# Patient Record
Sex: FEMALE | Race: WHITE | NOT HISPANIC OR LATINO | Employment: OTHER | URBAN - METROPOLITAN AREA
[De-identification: names, ages, dates, MRNs, and addresses within clinical notes are randomized per-mention and may not be internally consistent; named-entity substitution may affect disease eponyms.]

---

## 2017-01-02 PROBLEM — B18.2 CHRONIC HEPATITIS C WITHOUT HEPATIC COMA (H): Status: ACTIVE | Noted: 2017-01-02

## 2017-01-25 ENCOUNTER — TELEPHONE (OUTPATIENT)
Dept: TRANSPLANT | Facility: CLINIC | Age: 67
End: 2017-01-25

## 2017-01-25 NOTE — TELEPHONE ENCOUNTER
Spoke with Joselyn, who is upset that she did not know of the possibility that she would be allergic to lactose after having her gall bladder removed with transplant.  She states that no one told her about this possibility.    She has stopped eating any dairy, or cheese and her diarrhea is gone.   She asks if she could take a supplement, since she has no gall bladder.  Discussed with her that supplements are unregulated, and would need to review with pharmacy.  She would like to speak with the pharmacist.    Further discussion with Joselyn, expressing frustration of scheduling with , and f/u imaging, wondering why she has it again in March after having it done in Dec.  Advised that she speak with  about this at her next appt in March.  Joselyn states she is most upset with the news that her , Bruce was found to have melanoma, on his ear.   He had seen dermatology for a suspicious scalp lesion, which was found to be non cancerous.  Bruce has had a biopsy of a lymph node in his neck, will have the results this week.   She is very worried that it will be more cancer.

## 2017-01-25 NOTE — TELEPHONE ENCOUNTER
Message left by pt requesting information about taking supplements, has been having diarrhea, and believes it is from her gall bladder being removed with the transplant.   She has found supplements she would like to take, requesting a call back about these supplements.

## 2017-01-31 DIAGNOSIS — Z79.60 LONG-TERM USE OF IMMUNOSUPPRESSANT MEDICATION: ICD-10-CM

## 2017-01-31 DIAGNOSIS — Z94.4 LIVER REPLACED BY TRANSPLANT (H): ICD-10-CM

## 2017-01-31 LAB
ALBUMIN SERPL-MCNC: 3.5 G/DL (ref 3.4–5)
ALP SERPL-CCNC: 125 U/L (ref 40–150)
ALT SERPL W P-5'-P-CCNC: 18 U/L (ref 0–50)
ANION GAP SERPL CALCULATED.3IONS-SCNC: 9 MMOL/L (ref 3–14)
AST SERPL W P-5'-P-CCNC: 13 U/L (ref 0–45)
BILIRUB DIRECT SERPL-MCNC: <0.1 MG/DL (ref 0–0.2)
BILIRUB SERPL-MCNC: 0.3 MG/DL (ref 0.2–1.3)
BUN SERPL-MCNC: 31 MG/DL (ref 7–30)
CALCIUM SERPL-MCNC: 8.9 MG/DL (ref 8.5–10.1)
CHLORIDE SERPL-SCNC: 104 MMOL/L (ref 94–109)
CO2 SERPL-SCNC: 28 MMOL/L (ref 20–32)
CREAT SERPL-MCNC: 0.84 MG/DL (ref 0.52–1.04)
ERYTHROCYTE [DISTWIDTH] IN BLOOD BY AUTOMATED COUNT: 13.8 % (ref 10–15)
GFR SERPL CREATININE-BSD FRML MDRD: 68 ML/MIN/1.7M2
GLUCOSE SERPL-MCNC: 113 MG/DL (ref 70–99)
HCT VFR BLD AUTO: 36.1 % (ref 35–47)
HGB BLD-MCNC: 12.4 G/DL (ref 11.7–15.7)
MAGNESIUM SERPL-MCNC: 1.8 MG/DL (ref 1.6–2.3)
MCH RBC QN AUTO: 29.2 PG (ref 26.5–33)
MCHC RBC AUTO-ENTMCNC: 34.3 G/DL (ref 31.5–36.5)
MCV RBC AUTO: 85 FL (ref 78–100)
PHOSPHATE SERPL-MCNC: 3.7 MG/DL (ref 2.5–4.5)
PLATELET # BLD AUTO: 162 10E9/L (ref 150–450)
POTASSIUM SERPL-SCNC: 4.2 MMOL/L (ref 3.4–5.3)
PROT SERPL-MCNC: 7.1 G/DL (ref 6.8–8.8)
RBC # BLD AUTO: 4.24 10E12/L (ref 3.8–5.2)
SODIUM SERPL-SCNC: 141 MMOL/L (ref 133–144)
TACROLIMUS BLD-MCNC: 3.6 UG/L (ref 5–15)
TME LAST DOSE: ABNORMAL H
WBC # BLD AUTO: 3.5 10E9/L (ref 4–11)

## 2017-01-31 PROCEDURE — 36415 COLL VENOUS BLD VENIPUNCTURE: CPT | Performed by: INTERNAL MEDICINE

## 2017-01-31 PROCEDURE — 80197 ASSAY OF TACROLIMUS: CPT | Performed by: INTERNAL MEDICINE

## 2017-01-31 PROCEDURE — 80076 HEPATIC FUNCTION PANEL: CPT | Performed by: INTERNAL MEDICINE

## 2017-01-31 PROCEDURE — 84100 ASSAY OF PHOSPHORUS: CPT | Performed by: INTERNAL MEDICINE

## 2017-01-31 PROCEDURE — 80048 BASIC METABOLIC PNL TOTAL CA: CPT | Performed by: INTERNAL MEDICINE

## 2017-01-31 PROCEDURE — 85027 COMPLETE CBC AUTOMATED: CPT | Performed by: INTERNAL MEDICINE

## 2017-01-31 PROCEDURE — 83735 ASSAY OF MAGNESIUM: CPT | Performed by: INTERNAL MEDICINE

## 2017-02-02 ENCOUNTER — OFFICE VISIT (OUTPATIENT)
Dept: FAMILY MEDICINE | Facility: CLINIC | Age: 67
End: 2017-02-02
Payer: COMMERCIAL

## 2017-02-02 VITALS
SYSTOLIC BLOOD PRESSURE: 150 MMHG | RESPIRATION RATE: 12 BRPM | DIASTOLIC BLOOD PRESSURE: 96 MMHG | OXYGEN SATURATION: 99 % | HEIGHT: 65 IN | WEIGHT: 176.1 LBS | HEART RATE: 91 BPM | BODY MASS INDEX: 29.34 KG/M2 | TEMPERATURE: 97.9 F

## 2017-02-02 DIAGNOSIS — I10 ESSENTIAL HYPERTENSION WITH GOAL BLOOD PRESSURE LESS THAN 140/90: Primary | ICD-10-CM

## 2017-02-02 DIAGNOSIS — D84.9 IMMUNOSUPPRESSION (H): ICD-10-CM

## 2017-02-02 DIAGNOSIS — Z94.4 LIVER TRANSPLANT RECIPIENT (H): ICD-10-CM

## 2017-02-02 DIAGNOSIS — B18.2 CHRONIC HEPATITIS C WITHOUT HEPATIC COMA (H): ICD-10-CM

## 2017-02-02 DIAGNOSIS — K74.60 CIRRHOSIS OF LIVER WITHOUT ASCITES, UNSPECIFIED HEPATIC CIRRHOSIS TYPE (H): ICD-10-CM

## 2017-02-02 LAB
ALBUMIN SERPL-MCNC: 3.8 G/DL (ref 3.4–5)
ALP SERPL-CCNC: 136 U/L (ref 40–150)
ALT SERPL W P-5'-P-CCNC: 20 U/L (ref 0–50)
ANION GAP SERPL CALCULATED.3IONS-SCNC: 9 MMOL/L (ref 3–14)
AST SERPL W P-5'-P-CCNC: 11 U/L (ref 0–45)
BILIRUB SERPL-MCNC: 0.4 MG/DL (ref 0.2–1.3)
BUN SERPL-MCNC: 27 MG/DL (ref 7–30)
CALCIUM SERPL-MCNC: 9.1 MG/DL (ref 8.5–10.1)
CHLORIDE SERPL-SCNC: 104 MMOL/L (ref 94–109)
CO2 SERPL-SCNC: 29 MMOL/L (ref 20–32)
CREAT SERPL-MCNC: 0.71 MG/DL (ref 0.52–1.04)
GFR SERPL CREATININE-BSD FRML MDRD: 82 ML/MIN/1.7M2
GLUCOSE SERPL-MCNC: 99 MG/DL (ref 70–99)
POTASSIUM SERPL-SCNC: 4.7 MMOL/L (ref 3.4–5.3)
PROT SERPL-MCNC: 7.8 G/DL (ref 6.8–8.8)
SODIUM SERPL-SCNC: 142 MMOL/L (ref 133–144)

## 2017-02-02 PROCEDURE — 80053 COMPREHEN METABOLIC PANEL: CPT | Performed by: PHYSICIAN ASSISTANT

## 2017-02-02 PROCEDURE — 99213 OFFICE O/P EST LOW 20 MIN: CPT | Performed by: PHYSICIAN ASSISTANT

## 2017-02-02 PROCEDURE — 36415 COLL VENOUS BLD VENIPUNCTURE: CPT | Performed by: PHYSICIAN ASSISTANT

## 2017-02-02 RX ORDER — LOSARTAN POTASSIUM 50 MG/1
50 TABLET ORAL DAILY
Qty: 30 TABLET | Refills: 1 | Status: SHIPPED | OUTPATIENT
Start: 2017-02-02 | End: 2017-03-02 | Stop reason: DRUGHIGH

## 2017-02-02 NOTE — PATIENT INSTRUCTIONS
Increase losartan to 50 mg daily and follow up with us in one month.  Return urgently if any change in symptoms.    Continue to check blood pressure outside of clinic.  Work on diet and exercise to decrease weight

## 2017-02-02 NOTE — NURSING NOTE
"Chief Complaint   Patient presents with     Hypertension       Initial /96 mmHg  Pulse 91  Temp(Src) 97.9  F (36.6  C) (Oral)  Resp 12  Ht 1.651 m (5' 5\")  Wt 79.878 kg (176 lb 1.6 oz)  BMI 29.30 kg/m2  SpO2 99% Estimated body mass index is 29.3 kg/(m^2) as calculated from the following:    Height as of this encounter: 1.651 m (5' 5\").    Weight as of this encounter: 79.878 kg (176 lb 1.6 oz).  BP completed using cuff size: kenn Harris        "

## 2017-02-02 NOTE — MR AVS SNAPSHOT
After Visit Summary   2/2/2017    Joselyn Leigh    MRN: [id number]           Patient Information     Date Of Birth          1950        Visit Information        Provider Department      2/2/2017 2:00 PM Izabella Maldonado PA-C [facility]        Today's Diagnoses     Essential hypertension with goal blood pressure less than 140/90    -  1       Care Instructions    Increase losartan to 50 mg daily and follow up with us in one month.  Return urgently if any change in symptoms.    Continue to check blood pressure outside of clinic.  Work on diet and exercise to decrease weight          Follow-ups after your visit        Your next 10 appointments already scheduled     Feb 16, 2017 11:00 AM   (Arrive by 10:45 AM)   Transplant Skin Check with CONSTANCE Pearson MD   [facility] ([facility])    [address] [place] [id number]   [phone]            Mar 10, 2017  9:00 AM   (Arrive by 8:45 AM)   Return Liver Transplant with Chuck Mata MD   [facility] ([facility])    [address] [place] [address]   [phone]            Mar 10, 2017 10:20 AM   (Arrive by 10:05 AM)   CT CHEST/ABDOMEN/PELVIS W CONTRAST with UCCT2   [facility] [place] [facility] ([facility])    [address] [place] [id number] [phone]           Please bring any scans or X-rays taken at other hospitals, if similar tests were done. Also bring a list of your medicines, including vitamins, minerals and over-the-counter drugs. It is safest to leave personal items at home.  Be sure to tell your doctor:   If you have any allergies.   If there s any chance you are pregnant.   If you are breastfeeding.   If you have any special needs.  You may have contrast for this exam. To prepare:   Do not eat or drink for 2 hours before your exam. If you need to  take medicine, you may take it with small sips of water. (We may ask you to take liquid medicine as well.)   The day before your exam, drink extra fluids at least six 8-ounce glasses (unless your doctor tells you to restrict your fluids).  Patients over 70 or patients with diabetes or kidney problems:   If you haven t had a blood test (creatinine test) within the last 30 days, go to your clinic or Diagnostic Imaging Department for this test.  If you have diabetes:   If your kidney function is normal, continue taking your metformin (Avandamet, Glucophage, Glucovance, Metaglip) on the day of your exam.   If your kidney function is abnormal, wait 48 hours before restarting this medicine.  You will have oral contrast for this exam:   You will drink the contrast at home. Get this from your clinic or Diagnostic Imaging Department. Please follow the directions given.  Please wear loose clothing, such as a sweat suit or jogging clothes. Avoid snaps, zippers and other metal. We may ask you to undress and put on a hospital gown.  If you have any questions, please call the Imaging Department where you will have your exam.            Mar 13, 2017  8:15 AM   (Arrive by 8:00 AM)   Return Visit with Brian Ozuna MD   [facility] ([facility])    [address] [place] [id number] [phone]              Who to contact     If you have questions or need follow up information about today's clinic visit or your schedule please contact [facility] directly at [phone].  Normal or non-critical lab and imaging results will be communicated to you by MyChart, letter or phone within 4 business days after the clinic has received the results. If you do not hear from us within 7 days, please contact the clinic through MyChart or phone. If you have a critical or abnormal lab result, we will notify you by phone as soon as possible.  Submit refill  "requests through [employer] or call your pharmacy and they will forward the refill request to us. Please allow 3 business days for your refill to be completed.          Additional Information About Your Visit        FolderBoyhart Information     [employer] gives you secure access to your electronic health record. If you see a primary care provider, you can also send messages to your care team and make appointments. If you have questions, please call your primary care clinic.  If you do not have a primary care provider, please call [phone] and they will assist you.        Care EveryWhere ID     This is your Care EveryWhere ID. This could be used by other organizations to access your [facility] medical records  [id number]        Your Vitals Were     Pulse Temperature Respirations Height BMI (Body Mass Index) Pulse Oximetry    91 97.9  F (36.6  C) (Oral) 12 1.651 m (5' 5\") 29.30 kg/m2 99%       Blood Pressure from Last 3 Encounters:   02/02/17 150/96   12/29/16 150/98   12/19/16 160/95    Weight from Last 3 Encounters:   02/02/17 79.878 kg (176 lb 1.6 oz)   12/29/16 80.241 kg (176 lb 14.4 oz)   12/19/16 80.015 kg (176 lb 6.4 oz)              We Performed the Following     Comprehensive metabolic panel          Today's Medication Changes          These changes are accurate as of: 2/2/17  2:26 PM.  If you have any questions, ask your nurse or doctor.               These medicines have changed or have updated prescriptions.        Dose/Directions    * losartan 25 MG tablet   Commonly known as:  COZAAR   This may have changed:  Another medication with the same name was added. Make sure you understand how and when to take each.   Used for:  Essential hypertension   Changed by:  Izabella Maldonado PA-C        Dose:  25 mg   Take 1 tablet (25 mg) by mouth daily   Quantity:  30 tablet   Refills:  1       * losartan 50 MG tablet   Commonly known as:  COZAAR   This may have changed:  You were already taking a medication with the " same name, and this prescription was added. Make sure you understand how and when to take each.   Used for:  Essential hypertension with goal blood pressure less than 140/90   Changed by:  Izabella Maldonado PA-C        Dose:  50 mg   Take 1 tablet (50 mg) by mouth daily   Quantity:  30 tablet   Refills:  1       magnesium oxide 400 (241.3 MG) MG tablet   Commonly known as:  MAG-OX   This may have changed:  when to take this   Used for:  Low magnesium levels        Dose:  400 mg   Take 1 tablet (400 mg) by mouth 2 times daily   Quantity:  60 tablet   Refills:  11       * Notice:  This list has 2 medication(s) that are the same as other medications prescribed for you. Read the directions carefully, and ask your doctor or other care provider to review them with you.         Where to get your medicines      These medications were sent to [facility] [address] [place], [place] - [address] [facility] [address], [facility] [address]     Phone:  [phone]    - losartan 50 MG tablet             Primary Care Provider Office Phone #    [facility] [phone]       No address on file        Thank you!     Thank you for choosing [facility]  for your care. Our goal is always to provide you with excellent care. Hearing back from our patients is one way we can continue to improve our services. Please take a few minutes to complete the written survey that you may receive in the mail after your visit with us. Thank you!             Your Updated Medication List - Protect others around you: Learn how to safely use, store and throw away your medicines at www.disposemymeds.org.          This list is accurate as of: 2/2/17  2:26 PM.  Always use your most recent med list.                   Brand Name Dispense Instructions for use    calcium-vitamin D 600-400 MG-UNIT per tablet    CALTRATE     Take 1 tablet by mouth 2 times daily       * losartan 25  MG tablet    COZAAR    30 tablet    Take 1 tablet (25 mg) by mouth daily       * losartan 50 MG tablet    COZAAR    30 tablet    Take 1 tablet (50 mg) by mouth daily       magnesium oxide 400 (241.3 MG) MG tablet    MAG-OX    60 tablet    Take 1 tablet (400 mg) by mouth 2 times daily       Multi-vitamin Tabs tablet      Take 1 tablet by mouth daily prenatal       priLOSEC 20 MG CR capsule   Generic drug:  omeprazole      Take 20 mg by mouth daily       tacrolimus 1 MG capsule    PROGRAF - GENERIC EQUIVALENT    240 capsule    Take 4 capsules (4 mg) by mouth every 12 hours       * Notice:  This list has 2 medication(s) that are the same as other medications prescribed for you. Read the directions carefully, and ask your doctor or other care provider to review them with you.

## 2017-02-02 NOTE — PROGRESS NOTES
SUBJECTIVE:                                                    Joselyn Leigh is a 66 year old female who presents to clinic today for the following health issues:      Hypertension Follow-up      Outpatient blood pressures are being checked at home.  Results are 130s/90.    Low Salt Diet: low salt       Amount of exercise or physical activity: None    Problems taking medications regularly: No    Medication side effects: none    Diet: regular (no restrictions)    Has blood pressure monitor at home and reports readings of 132/90  No exercise.   Pressure in abdomen when eats food.  Feels bloated. Colonoscopy recommended in five years.  Normal colonoscopy .  Previous colonoscopy 3 yrs prior with polyps  History of liver transplant.  Labs just completed 17.    Problem list and histories reviewed & adjusted, as indicated.  Additional history: as documented    Patient Active Problem List   Diagnosis     HCC (hepatocellular carcinoma) (H)     History of colonic polyps     Chronic hepatitis C (H)     Drug-induced mental disorder (H)     Cirrhosis of liver without ascites (H)     Liver transplant recipient (H)     Liver replaced by transplant (H)     Immunosuppression (H)     Steroid-induced hyperglycemia     Diarrhea     Hypervolemia     Chronic hepatitis C without hepatic coma (H)     Past Surgical History   Procedure Laterality Date      section       Transplant liver recipient  donor N/A 5/10/2016     Procedure: TRANSPLANT LIVER RECIPIENT  DONOR;  Surgeon: Farshad Nowak MD;  Location:  OR     Esophagoscopy, gastroscopy, duodenoscopy (egd), combined N/A 2016     Procedure: COMBINED ESOPHAGOSCOPY, GASTROSCOPY, DUODENOSCOPY (EGD), REMOVE FOREIGN BODY;  Surgeon: George Crockett MD;  Location:  GI       Social History   Substance Use Topics     Smoking status: Former Smoker     Types: Cigarettes     Smokeless tobacco: Never Used     Alcohol Use: No     Family History  "  Problem Relation Age of Onset     Blood Disease Mother      leukemia     Prostate Cancer Father      DIABETES Mother      DIABETES Father          Current Outpatient Prescriptions   Medication Sig Dispense Refill            losartan (COZAAR) 25 MG tablet Take 1 tablet (25 mg) by mouth daily 30 tablet 1     tacrolimus (PROGRAF - GENERIC EQUIVALENT) 1 MG capsule Take 4 capsules (4 mg) by mouth every 12 hours 240 capsule 11     magnesium oxide (MAG-OX) 400 (241.3 MG) MG tablet Take 1 tablet (400 mg) by mouth 2 times daily (Patient taking differently: Take 400 mg by mouth daily ) 60 tablet 11     calcium-vitamin D (CALTRATE) 600-400 MG-UNIT per tablet Take 1 tablet by mouth 2 times daily       omeprazole (PRILOSEC) 20 MG capsule Take 20 mg by mouth daily       multivitamin, therapeutic with minerals (MULTI-VITAMIN) TABS Take 1 tablet by mouth daily prenatal         ROS:  Constitutional, HEENT, cardiovascular, pulmonary, gi and gu systems are negative, except as otherwise noted.    OBJECTIVE:                                                    /96 mmHg  Pulse 91  Temp(Src) 97.9  F (36.6  C) (Oral)  Resp 12  Ht 1.651 m (5' 5\")  Wt 79.878 kg (176 lb 1.6 oz)  BMI 29.30 kg/m2  SpO2 99%  Body mass index is 29.3 kg/(m^2).  GENERAL: healthy, alert and no distress  NECK: no adenopathy, no asymmetry, masses, or scars and thyroid normal to palpation  RESP: lungs clear to auscultation - no rales, rhonchi or wheezes  CV: regular rate and rhythm, normal S1 S2, no S3 or S4, no murmur, click or rub, no peripheral edema and peripheral pulses strong  ABDOMEN: soft, nontender, no hepatosplenomegaly, no masses and bowel sounds normal  MS: no gross musculoskeletal defects noted, no edema    Diagnostic Test Results:  Results for orders placed or performed in visit on 02/02/17   Comprehensive metabolic panel   Result Value Ref Range    Sodium 142 133 - 144 mmol/L    Potassium 4.7 3.4 - 5.3 mmol/L    Chloride 104 94 - 109 mmol/L " "   Carbon Dioxide 29 20 - 32 mmol/L    Anion Gap 9 3 - 14 mmol/L    Glucose 99 70 - 99 mg/dL    Urea Nitrogen 27 7 - 30 mg/dL    Creatinine 0.71 0.52 - 1.04 mg/dL    GFR Estimate 82 >60 mL/min/1.7m2    GFR Estimate If Black >90   GFR Calc   >60 mL/min/1.7m2    Calcium 9.1 8.5 - 10.1 mg/dL    Bilirubin Total 0.4 0.2 - 1.3 mg/dL    Albumin 3.8 3.4 - 5.0 g/dL    Protein Total 7.8 6.8 - 8.8 g/dL    Alkaline Phosphatase 136 40 - 150 U/L    ALT 20 0 - 50 U/L    AST 11 0 - 45 U/L        ASSESSMENT/PLAN:                                                        BMI:   Estimated body mass index is 29.3 kg/(m^2) as calculated from the following:    Height as of this encounter: 1.651 m (5' 5\").    Weight as of this encounter: 79.878 kg (176 lb 1.6 oz).   Weight management plan: Discussed healthy diet and exercise guidelines and patient will follow up in 1 month in clinic to re-evaluate.      1. Essential hypertension with goal blood pressure less than 140/90  Blood pressure not at goal.  Will increase cozaar and follow up in one month  - Comprehensive metabolic panel  - losartan (COZAAR) 50 MG tablet; Take 1 tablet (50 mg) by mouth daily  Dispense: 30 tablet; Refill: 1    2. Chronic hepatitis C without hepatic coma (H)  Followed by GI     3. Immunosuppression (H)  Due to liver transplant    4. Liver transplant recipient (H)  5/10/16.  Followed at      5. Cirrhosis of liver without ascites, unspecified hepatic cirrhosis type (H)  Transplant of liver.  Followed by        Patient Instructions   Increase losartan to 50 mg daily and follow up with us in one month.  Return urgently if any change in symptoms.    Continue to check blood pressure outside of clinic.  Work on diet and exercise to decrease weight           Izabella Maldonado PA-C  [facility]"

## 2017-02-03 NOTE — PROGRESS NOTES
Quick Note:    Wu Alves  Your electrolytes, blood sugar, kidney function, and liver function were normal.   Please call or MyChart my office with any questions or concerns.   Izabella Maldonado, PAC            ______

## 2017-02-05 PROBLEM — K74.60 CIRRHOSIS OF LIVER WITHOUT ASCITES, UNSPECIFIED HEPATIC CIRRHOSIS TYPE (H): Status: ACTIVE | Noted: 2017-02-05

## 2017-02-07 PROBLEM — I10 ESSENTIAL HYPERTENSION WITH GOAL BLOOD PRESSURE LESS THAN 140/90: Status: ACTIVE | Noted: 2017-02-07

## 2017-02-16 ENCOUNTER — OFFICE VISIT (OUTPATIENT)
Dept: DERMATOLOGY | Facility: CLINIC | Age: 67
End: 2017-02-16

## 2017-02-16 DIAGNOSIS — D22.9 MULTIPLE BENIGN NEVI: ICD-10-CM

## 2017-02-16 DIAGNOSIS — L90.5 SCAR: ICD-10-CM

## 2017-02-16 DIAGNOSIS — Z94.89 TRANSPLANT RECIPIENT: Primary | ICD-10-CM

## 2017-02-16 ASSESSMENT — PAIN SCALES - GENERAL: PAINLEVEL: NO PAIN (0)

## 2017-02-16 NOTE — LETTER
2/16/2017       RE: Joselyn Leigh  [address]  [facility] [address]     Dear Colleague,    Thank you for referring your patient, Joselyn Leigh, to the [facility] at [facility]. Please see a copy of my visit note below.     Dictation on: 02/16/2017 12:48 PM by: CONSTANCE EPPS [[id number]]         Again, thank you for allowing me to participate in the care of your patient.      Sincerely,    CONSTANCE Epps MD

## 2017-02-16 NOTE — MR AVS SNAPSHOT
After Visit Summary   2/16/2017    Joselyn Leigh    MRN: [id number]           Patient Information     Date Of Birth          1950        Visit Information        Provider Department      2/16/2017 11:00 AM CONSTANCE Pearson MD [facility] Dermatology        Today's Diagnoses     Transplant recipient    -  1    Multiple benign nevi        Scar           Follow-ups after your visit        Your next 10 appointments already scheduled     Mar 02, 2017  8:40 AM CST   Office Visit with Izabella Maldonado PA-C   [facility]    [address] [place] [id number] [phone]           Bring a current list of meds and any records pertaining to this visit.  For Physicals, please bring immunization records and any forms needing to be filled out.  Please arrive 10 minutes early to complete paperwork.            Mar 02, 2017  9:00 AM CST   LAB with BA LAB ONLY   [facility] ([facility])    [address] [place] [id number] [phone]           Patient must bring picture ID.  Patient should be prepared to give a urine specimen  Please do not eat 10-12 hours before your appointment if you are coming in fasting for labs on lipids, cholesterol, or glucose (sugar).  Pregnant women should follow their Care Team instructions. Water with medications is okay. Do not drink coffee or other fluids.   If you have concerns about taking  your medications, please ask at office or if scheduling via CR2hart, send a message by clicking on Secure Messaging, Message Your Care Team.            Mar 10, 2017  9:00 AM CST   (Arrive by 8:45 AM)   Return Liver Transplant with Chuck Mata MD   [facility] Hepatology ([facility])    [address] [place] [id number] [phone]            Mar 10, 2017 10:20 AM CST   (Arrive by 10:05 AM)   CT CHEST/ABDOMEN/PELVIS W  CONTRAST with UCCT2   [facility] ([facility])    [address]  1st Floor  [facility] [address] [phone]           Please bring any scans or X-rays taken at other hospitals, if similar tests were done. Also bring a list of your medicines, including vitamins, minerals and over-the-counter drugs. It is safest to leave personal items at home.  Be sure to tell your doctor:   If you have any allergies.   If there s any chance you are pregnant.   If you are breastfeeding.   If you have any special needs.  You may have contrast for this exam. To prepare:   Do not eat or drink for 2 hours before your exam. If you need to take medicine, you may take it with small sips of water. (We may ask you to take liquid medicine as well.)   The day before your exam, drink extra fluids at least six 8-ounce glasses (unless your doctor tells you to restrict your fluids).  Patients over 70 or patients with diabetes or kidney problems:   If you haven t had a blood test (creatinine test) within the last 30 days, go to your clinic or Diagnostic Imaging Department for this test.  If you have diabetes:   If your kidney function is normal, continue taking your metformin (Avandamet, Glucophage, Glucovance, Metaglip) on the day of your exam.   If your kidney function is abnormal, wait 48 hours before restarting this medicine.  You will have oral contrast for this exam:   You will drink the contrast at home. Get this from your clinic or Diagnostic Imaging Department. Please follow the directions given.  Please wear loose clothing, such as a sweat suit or jogging clothes. Avoid snaps, zippers and other metal. We may ask you to undress and put on a hospital gown.  If you have any questions, please call the Imaging Department where you will have your exam.            Mar 13, 2017  8:15 AM CDT   (Arrive by 8:00 AM)   Return Visit with Brian Ozuna MD   [facility] (  [facility])    [address]  2nd Floor  [facility] [address] [phone]              Who to contact     Please call your clinic at [phone] to:    Ask questions about your health    Make or cancel appointments    Discuss your medicines    Learn about your test results    Speak to your doctor   If you have compliments or concerns about an experience at your clinic, or if you wish to file a complaint, please contact [facility] Patient Relations at [phone] or email us at [email or web address]         Additional Information About Your Visit        [employer] Information     [employer] gives you secure access to your electronic health record. If you see a primary care provider, you can also send messages to your care team and make appointments. If you have questions, please call your primary care clinic.  If you do not have a primary care provider, please call [phone] and they will assist you.      Fision is an electronic gateway that provides easy, online access to your medical records. With [employer], you can request a clinic appointment, read your test results, renew a prescription or communicate with your care team.     To access your existing account, please contact your [facility] or call [phone] for assistance.        Care EveryWhere ID     This is your Care EveryWhere ID. This could be used by other organizations to access your [place] medical records  [id number]         Blood Pressure from Last 3 Encounters:   No data found for BP    Weight from Last 3 Encounters:   No data found for Wt              Today, you had the following     No orders found for display         Today's Medication Changes          These changes are accurate as of: 2/16/17 11:59 PM.  If you have any questions, ask your nurse or doctor.               These medicines have changed or have updated prescriptions.         Dose/Directions    magnesium oxide 400 (241.3 MG) MG tablet   Commonly known as:  MAG-OX   This may have changed:  when to take this   Used for:  Low magnesium levels        Dose:  400 mg   Take 1 tablet (400 mg) by mouth 2 times daily   Quantity:  60 tablet   Refills:  11                Primary Care Provider Office Phone #    [facility] [phone]       No address on file        Thank you!     Thank you for choosing [facility]  for your care. Our goal is always to provide you with excellent care. Hearing back from our patients is one way we can continue to improve our services. Please take a few minutes to complete the written survey that you may receive in the mail after your visit with us. Thank you!             Your Updated Medication List - Protect others around you: Learn how to safely use, store and throw away your medicines at www.disposemymeds.org.          This list is accurate as of: 2/16/17 11:59 PM.  Always use your most recent med list.                   Brand Name Dispense Instructions for use    calcium-vitamin D 600-400 MG-UNIT per tablet    CALTRATE     Take 1 tablet by mouth 2 times daily       losartan 50 MG tablet    COZAAR    30 tablet    Take 1 tablet (50 mg) by mouth daily       magnesium oxide 400 (241.3 MG) MG tablet    MAG-OX    60 tablet    Take 1 tablet (400 mg) by mouth 2 times daily       Multi-vitamin Tabs tablet      Take 1 tablet by mouth daily prenatal       OSTEO BI-FLEX ADV DOUBLE ST PO      Take by mouth daily       priLOSEC 20 MG CR capsule   Generic drug:  omeprazole      Take 20 mg by mouth daily       tacrolimus 1 MG capsule    PROGRAF - GENERIC EQUIVALENT    240 capsule    Take 4 capsules (4 mg) by mouth every 12 hours

## 2017-02-16 NOTE — NURSING NOTE
Dermatology Rooming Note    Joselyn Leigh's goals for this visit include:   Chief Complaint   Patient presents with     Skin Check     transplant skin check         Janett Lara LPN

## 2017-02-16 NOTE — LETTER
2/16/2017      RE: Joselyn Leigh  [address]  [place] [address]       SUBJECTIVE:  Joselyn comes in for a general skin check.  She has had some significant issues since  transplantation of her liver.  She has been doing well lately.  Unfortunately, her  is having surgery for melanoma today.  She requests a complete skin exam.      OBJECTIVE:  Shows a healthy lady in no distress.  We checked her face, neck, scalp, arms, legs, back.  We did not check breasts or genital areas or buttocks.  She has  some scattered nevi.  She seemed a little pale. .  She showed no pigmented lesions of any concern.  She was concerned about 2 lesions on her thigh which appeared to be early nevi, very light brown small macular lesions. Dermoscopy negative for concerning components.     Medications and allergies were reviewed.      ASSESSMENT:  Overall doing well, all things considered in transplant recipient.      PLAN:  Reassured.  Advised the lesions on her thighs were harmless in my opinion, did not need a biopsy.      Return in 1 year if all goes well.  We discussed self examination and how important that was and also to have her  look at her back if she cannot do that herself.        She has avoided sun most of her life and as a result has almost no sun damage other than some freckling on her upper back.       CONSTANCE Pearson MD

## 2017-02-16 NOTE — PROGRESS NOTES
SUBJECTIVE:  Joselyn comes in for a general skin check.  She has had some significant issues since  transplantation of her liver.  She has been doing well lately.  Unfortunately, her  is having surgery for melanoma today.  She requests a complete skin exam.      OBJECTIVE:  Shows a healthy lady in no distress.  We checked her face, neck, scalp, arms, legs, back.  We did not check breasts or genital areas or buttocks.  She has  some scattered nevi.  She seemed a little pale. .  She showed no pigmented lesions of any concern.  She was concerned about 2 lesions on her thigh which appeared to be early nevi, very light brown small macular lesions. Dermoscopy negative for concerning components.     Medications and allergies were reviewed.      ASSESSMENT:  Overall doing well, all things considered in transplant recipient.      PLAN:  Reassured.  Advised the lesions on her thighs were harmless in my opinion, did not need a biopsy.      Return in 1 year if all goes well.  We discussed self examination and how important that was and also to have her  look at her back if she cannot do that herself.        She has avoided sun most of her life and as a result has almost no sun damage other than some freckling on her upper back.

## 2017-02-22 ENCOUNTER — TELEPHONE (OUTPATIENT)
Dept: TRANSPLANT | Facility: CLINIC | Age: 67
End: 2017-02-22

## 2017-03-02 ENCOUNTER — OFFICE VISIT (OUTPATIENT)
Dept: FAMILY MEDICINE | Facility: CLINIC | Age: 67
End: 2017-03-02
Payer: COMMERCIAL

## 2017-03-02 ENCOUNTER — TELEPHONE (OUTPATIENT)
Dept: GASTROENTEROLOGY | Facility: CLINIC | Age: 67
End: 2017-03-02

## 2017-03-02 VITALS
HEIGHT: 65 IN | OXYGEN SATURATION: 99 % | TEMPERATURE: 97.8 F | DIASTOLIC BLOOD PRESSURE: 86 MMHG | WEIGHT: 180.4 LBS | BODY MASS INDEX: 30.06 KG/M2 | RESPIRATION RATE: 12 BRPM | SYSTOLIC BLOOD PRESSURE: 130 MMHG | HEART RATE: 85 BPM

## 2017-03-02 DIAGNOSIS — I10 ESSENTIAL HYPERTENSION WITH GOAL BLOOD PRESSURE LESS THAN 140/90: Primary | ICD-10-CM

## 2017-03-02 DIAGNOSIS — G89.29 CHRONIC PAIN OF RIGHT KNEE: ICD-10-CM

## 2017-03-02 DIAGNOSIS — M25.561 CHRONIC PAIN OF RIGHT KNEE: ICD-10-CM

## 2017-03-02 DIAGNOSIS — C22.0 HCC (HEPATOCELLULAR CARCINOMA) (H): ICD-10-CM

## 2017-03-02 DIAGNOSIS — Z79.60 LONG-TERM USE OF IMMUNOSUPPRESSANT MEDICATION: ICD-10-CM

## 2017-03-02 DIAGNOSIS — Z94.4 LIVER REPLACED BY TRANSPLANT (H): ICD-10-CM

## 2017-03-02 DIAGNOSIS — Z23 NEED FOR PROPHYLACTIC VACCINATION AGAINST STREPTOCOCCUS PNEUMONIAE (PNEUMOCOCCUS): ICD-10-CM

## 2017-03-02 LAB
ALBUMIN SERPL-MCNC: 3.6 G/DL (ref 3.4–5)
ALP SERPL-CCNC: 111 U/L (ref 40–150)
ALT SERPL W P-5'-P-CCNC: 20 U/L (ref 0–50)
ANION GAP SERPL CALCULATED.3IONS-SCNC: 9 MMOL/L (ref 3–14)
AST SERPL W P-5'-P-CCNC: 12 U/L (ref 0–45)
BILIRUB DIRECT SERPL-MCNC: 0.1 MG/DL (ref 0–0.2)
BILIRUB SERPL-MCNC: 0.6 MG/DL (ref 0.2–1.3)
BUN SERPL-MCNC: 30 MG/DL (ref 7–30)
CALCIUM SERPL-MCNC: 8.7 MG/DL (ref 8.5–10.1)
CHLORIDE SERPL-SCNC: 105 MMOL/L (ref 94–109)
CO2 SERPL-SCNC: 28 MMOL/L (ref 20–32)
CREAT SERPL-MCNC: 0.8 MG/DL (ref 0.52–1.04)
ERYTHROCYTE [DISTWIDTH] IN BLOOD BY AUTOMATED COUNT: 13.7 % (ref 10–15)
GFR SERPL CREATININE-BSD FRML MDRD: 72 ML/MIN/1.7M2
GLUCOSE SERPL-MCNC: 137 MG/DL (ref 70–99)
HCT VFR BLD AUTO: 34.3 % (ref 35–47)
HGB BLD-MCNC: 12 G/DL (ref 11.7–15.7)
MAGNESIUM SERPL-MCNC: 1.7 MG/DL (ref 1.6–2.3)
MCH RBC QN AUTO: 29.9 PG (ref 26.5–33)
MCHC RBC AUTO-ENTMCNC: 35 G/DL (ref 31.5–36.5)
MCV RBC AUTO: 86 FL (ref 78–100)
PHOSPHATE SERPL-MCNC: 4 MG/DL (ref 2.5–4.5)
PLATELET # BLD AUTO: 145 10E9/L (ref 150–450)
POTASSIUM SERPL-SCNC: 4.5 MMOL/L (ref 3.4–5.3)
PROT SERPL-MCNC: 7.1 G/DL (ref 6.8–8.8)
RBC # BLD AUTO: 4.01 10E12/L (ref 3.8–5.2)
SODIUM SERPL-SCNC: 142 MMOL/L (ref 133–144)
TACROLIMUS BLD-MCNC: 6.9 UG/L (ref 5–15)
TME LAST DOSE: NORMAL H
WBC # BLD AUTO: 3.1 10E9/L (ref 4–11)

## 2017-03-02 PROCEDURE — 80076 HEPATIC FUNCTION PANEL: CPT | Performed by: INTERNAL MEDICINE

## 2017-03-02 PROCEDURE — 84100 ASSAY OF PHOSPHORUS: CPT | Performed by: INTERNAL MEDICINE

## 2017-03-02 PROCEDURE — 99214 OFFICE O/P EST MOD 30 MIN: CPT | Mod: 25 | Performed by: PHYSICIAN ASSISTANT

## 2017-03-02 PROCEDURE — 36415 COLL VENOUS BLD VENIPUNCTURE: CPT | Performed by: INTERNAL MEDICINE

## 2017-03-02 PROCEDURE — 80197 ASSAY OF TACROLIMUS: CPT | Performed by: INTERNAL MEDICINE

## 2017-03-02 PROCEDURE — G0009 ADMIN PNEUMOCOCCAL VACCINE: HCPCS | Performed by: PHYSICIAN ASSISTANT

## 2017-03-02 PROCEDURE — 83735 ASSAY OF MAGNESIUM: CPT | Performed by: INTERNAL MEDICINE

## 2017-03-02 PROCEDURE — 85027 COMPLETE CBC AUTOMATED: CPT | Performed by: INTERNAL MEDICINE

## 2017-03-02 PROCEDURE — 90670 PCV13 VACCINE IM: CPT | Performed by: PHYSICIAN ASSISTANT

## 2017-03-02 PROCEDURE — 80048 BASIC METABOLIC PNL TOTAL CA: CPT | Performed by: INTERNAL MEDICINE

## 2017-03-02 PROCEDURE — 82105 ALPHA-FETOPROTEIN SERUM: CPT | Performed by: NURSE PRACTITIONER

## 2017-03-02 RX ORDER — LOSARTAN POTASSIUM 100 MG/1
100 TABLET ORAL DAILY
Qty: 30 TABLET | Refills: 1 | Status: SHIPPED | OUTPATIENT
Start: 2017-03-02 | End: 2017-04-27

## 2017-03-02 NOTE — PATIENT INSTRUCTIONS
Increase cozaar to 100 mg daily  Follow up with us in one month.   Return urgently if any change in symptoms.    Continue to check blood pressure.   Work on diet and limit sodium to no more than 2 grams daily.

## 2017-03-02 NOTE — NURSING NOTE
Screening Questionnaire for Adult Immunization    Are you sick today?   No   Do you have allergies to medications, food, a vaccine component or latex?   No   Have you ever had a serious reaction after receiving a vaccination?   No   Do you have a long-term health problem with heart disease, lung disease, asthma, kidney disease, metabolic disease (e.g. diabetes), anemia, or other blood disorder?   No   Do you have cancer, leukemia, HIV/AIDS, or any other immune system problem?   No   In the past 3 months, have you taken medications that affect  your immune system, such as prednisone, other steroids, or anticancer drugs; drugs for the treatment of rheumatoid arthritis, Crohn s disease, or psoriasis; or have you had radiation treatments?   No   Have you had a seizure, or a brain or other nervous system problem?   No   During the past year, have you received a transfusion of blood or blood     products, or been given immune (gamma) globulin or antiviral drug?   No   For women: Are you pregnant or is there a chance you could become        pregnant during the next month?   No   Have you received any vaccinations in the past 4 weeks?   No     Immunization questionnaire answers were all negative.      MNVFC doesn't apply on this patient    Per orders of Izabella Maldonado, injection of Prev 13 given by Ivonne Harris. Patient instructed to remain in clinic for 20 minutes afterwards, and to report any adverse reaction to me immediately.       Screening performed by Ivonne Harris on 3/2/2017 at 9:11 AM.

## 2017-03-02 NOTE — TELEPHONE ENCOUNTER
Patient contacted and reminded of upcoming appointment.  Patient confirmed they will be attending.  Patient instructed to bring updated medications list to appointment.  Patient instructed to arrive early for check-in  Pierre Adams CMA

## 2017-03-02 NOTE — PROGRESS NOTES
SUBJECTIVE:                                                    Joselyn Leigh is a 67 year old female who presents to clinic today for the following health issues:      Hypertension Follow-up      Outpatient blood pressures rarely    Low Salt Diet: not monitoring salt       Amount of exercise or physical activity: None    Problems taking medications regularly: No    Medication side effects: none  Diet: regular (no restrictions)      Also complains of right knee pain for several months.  Injury in [place] approximately 2016 and banged right knee into rock as  grabbed her wrist to pull her up on rock.  No swelling. Unable to exercise due to pain  Had negative xray 2016    Reports blood pressure does fluctuate.  Will have numbers 130s/80s but also a lot of numbers 150s/90s  Trying to look at salt.  Continues to have chronic diarrhea.  Doesn't matter what she eats.  Fiber (like metamucil) makes worse.      Problem list and histories reviewed & adjusted, as indicated.  Additional history: as documented    Patient Active Problem List   Diagnosis     HCC (hepatocellular carcinoma) (H)     History of colonic polyps     Chronic hepatitis C (H)     Drug-induced mental disorder (H)     Cirrhosis of liver without ascites (H)     Liver transplant recipient (H)     Liver replaced by transplant (H)     Immunosuppression (H)     Steroid-induced hyperglycemia     Diarrhea     Hypervolemia     Chronic hepatitis C without hepatic coma (H)     Cirrhosis of liver without ascites, unspecified hepatic cirrhosis type (H)     Essential hypertension with goal blood pressure less than 140/90     Past Surgical History   Procedure Laterality Date      section       Transplant liver recipient  donor N/A 5/10/2016     Procedure: TRANSPLANT LIVER RECIPIENT  DONOR;  Surgeon: Farshad Nowak MD;  Location:  OR     Esophagoscopy, gastroscopy, duodenoscopy (egd), combined N/A 2016     Procedure:  "COMBINED ESOPHAGOSCOPY, GASTROSCOPY, DUODENOSCOPY (EGD), REMOVE FOREIGN BODY;  Surgeon: George Crockett MD;  Location:  GI       Social History   Substance Use Topics     Smoking status: Former Smoker     Types: Cigarettes     Smokeless tobacco: Never Used     Alcohol use No     Family History   Problem Relation Age of Onset     Blood Disease Mother      leukemia     DIABETES Mother      Prostate Cancer Father      DIABETES Father      Melanoma No family hx of      Skin Cancer No family hx of          Current Outpatient Prescriptions   Medication Sig Dispense Refill            Misc Natural Products (OSTEO BI-FLEX ADV DOUBLE ST PO) Take by mouth daily       losartan (COZAAR) 50 MG tablet Take 1 tablet (50 mg) by mouth daily 30 tablet 1     tacrolimus (PROGRAF - GENERIC EQUIVALENT) 1 MG capsule Take 4 capsules (4 mg) by mouth every 12 hours 240 capsule 11     magnesium oxide (MAG-OX) 400 (241.3 MG) MG tablet Take 1 tablet (400 mg) by mouth 2 times daily (Patient taking differently: Take 400 mg by mouth daily ) 60 tablet 11     calcium-vitamin D (CALTRATE) 600-400 MG-UNIT per tablet Take 1 tablet by mouth 2 times daily       omeprazole (PRILOSEC) 20 MG capsule Take 20 mg by mouth daily       multivitamin, therapeutic with minerals (MULTI-VITAMIN) TABS Take 1 tablet by mouth daily prenatal         Reviewed and updated as needed this visit by clinical staff  Tobacco  Allergies  Meds  Med Hx  Surg Hx  Fam Hx  Soc Hx      Reviewed and updated as needed this visit by Provider         ROS:  Constitutional, HEENT, cardiovascular, pulmonary, gi and gu systems are negative, except as otherwise noted.    OBJECTIVE:                                                    /86  Pulse 85  Temp 97.8  F (36.6  C) (Oral)  Resp 12  Ht 1.651 m (5' 5\")  Wt 81.8 kg (180 lb 6.4 oz)  SpO2 99%  BMI 30.02 kg/m2  Body mass index is 30.02 kg/(m^2).  GENERAL: healthy, alert and no distress  NECK: no adenopathy, no asymmetry, " "masses, or scars and thyroid normal to palpation  RESP: lungs clear to auscultation - no rales, rhonchi or wheezes  CV: regular rate and rhythm, normal S1 S2, no S3 or S4, no murmur, click or rub, no peripheral edema and peripheral pulses strong  ABDOMEN: soft, nontender, no hepatosplenomegaly, no masses and bowel sounds normal  MS: no edema    Diagnostic Test Results:  none      ASSESSMENT/PLAN:                                                        BMI:   Estimated body mass index is 30.02 kg/(m^2) as calculated from the following:    Height as of this encounter: 1.651 m (5' 5\").    Weight as of this encounter: 81.8 kg (180 lb 6.4 oz).   Weight management plan: Discussed healthy diet and exercise guidelines and patient will follow up in 1 month in clinic to re-evaluate.      1. Essential hypertension with goal blood pressure less than 140/90  Blood pressure not always at goal. Will increase cozaar from 50 mg daily to 100mg daily and follow up with me in one month  - losartan (COZAAR) 100 MG tablet; Take 1 tablet (100 mg) by mouth daily  Dispense: 30 tablet; Refill: 1    2. Need for prophylactic vaccination against Streptococcus pneumoniae (pneumococcus)  Has had PPSV 23 and will get PCV 13 today  - PNEUMOCOCCAL CONJ VACCINE 13 VALENT IM (PREVNAR 13)    3. Chronic pain of right knee  Follow up with orthopedics  - ORTHOPEDICS ADULT REFERRAL    Patient Instructions   Increase cozaar to 100 mg daily  Follow up with us in one month.   Return urgently if any change in symptoms.    Continue to check blood pressure.   Work on diet and limit sodium to no more than 2 grams daily.         Izabella Maldonado PA-C  [facility]"

## 2017-03-02 NOTE — NURSING NOTE
"Chief Complaint   Patient presents with     Hypertension       Initial BP (!) 140/100  Pulse 85  Temp 97.8  F (36.6  C) (Oral)  Resp 12  Ht 1.651 m (5' 5\")  Wt 81.8 kg (180 lb 6.4 oz)  SpO2 99%  BMI 30.02 kg/m2 Estimated body mass index is 30.02 kg/(m^2) as calculated from the following:    Height as of this encounter: 1.651 m (5' 5\").    Weight as of this encounter: 81.8 kg (180 lb 6.4 oz).  Medication Reconciliation: lisbeth Harris        "

## 2017-03-02 NOTE — MR AVS SNAPSHOT
After Visit Summary   3/2/2017    Joselyn Leigh    MRN: [id number]           Patient Information     Date Of Birth          1950        Visit Information        Provider Department      3/2/2017 8:40 AM Izabella Maldonado PA-C [facility]        Today's Diagnoses     Essential hypertension with goal blood pressure less than 140/90    -  1      Care Instructions    Increase cozaar to 100 mg daily  Follow up with us in one month.   Return urgently if any change in symptoms.    Continue to check blood pressure.   Work on diet and limit sodium to no more than 2 grams daily.          Follow-ups after your visit        Your next 10 appointments already scheduled     Mar 10, 2017  9:00 AM CST   (Arrive by 8:45 AM)   Return Liver Transplant with Chuck Mata MD   [facility] Hepatology ([facility])    [address] [place] [id number]   [phone]            Mar 10, 2017 10:20 AM CST   (Arrive by 10:05 AM)   CT CHEST/ABDOMEN/PELVIS W CONTRAST with UCCT2   [facility] [place] [facility] ([facility])    [address] [place] [id number] [phone]           Please bring any scans or X-rays taken at other hospitals, if similar tests were done. Also bring a list of your medicines, including vitamins, minerals and over-the-counter drugs. It is safest to leave personal items at home.  Be sure to tell your doctor:   If you have any allergies.   If there s any chance you are pregnant.   If you are breastfeeding.   If you have any special needs.  You may have contrast for this exam. To prepare:   Do not eat or drink for 2 hours before your exam. If you need to take medicine, you may take it with small sips of water. (We may ask you to take liquid medicine as well.)   The day before your exam, drink extra fluids at least six 8-ounce glasses (unless your doctor tells you to restrict your fluids).   Patients over 70 or patients with diabetes or kidney problems:   If you haven t had a blood test (creatinine test) within the last 30 days, go to your clinic or Diagnostic Imaging Department for this test.  If you have diabetes:   If your kidney function is normal, continue taking your metformin (Avandamet, Glucophage, Glucovance, Metaglip) on the day of your exam.   If your kidney function is abnormal, wait 48 hours before restarting this medicine.  You will have oral contrast for this exam:   You will drink the contrast at home. Get this from your clinic or Diagnostic Imaging Department. Please follow the directions given.  Please wear loose clothing, such as a sweat suit or jogging clothes. Avoid snaps, zippers and other metal. We may ask you to undress and put on a hospital gown.  If you have any questions, please call the Imaging Department where you will have your exam.            Mar 13, 2017  8:15 AM CDT   (Arrive by 8:00 AM)   Return Visit with Brian Ozuna MD   [facility] ([facility])    [address] [place] [id number] [phone]              Who to contact     If you have questions or need follow up information about today's clinic visit or your schedule please contact [facility] directly at [phone].  Normal or non-critical lab and imaging results will be communicated to you by MyChart, letter or phone within 4 business days after the clinic has received the results. If you do not hear from us within 7 days, please contact the clinic through MyChart or phone. If you have a critical or abnormal lab result, we will notify you by phone as soon as possible.  Submit refill requests through Mandic or call your pharmacy and they will forward the refill request to us. Please allow 3 business days for your refill to be completed.          Additional Information About Your Visit        Mandic Information      "SaySwap gives you secure access to your electronic health record. If you see a primary care provider, you can also send messages to your care team and make appointments. If you have questions, please call your primary care clinic.  If you do not have a primary care provider, please call [phone] and they will assist you.        Care EveryWhere ID     This is your Care EveryWhere ID. This could be used by other organizations to access your [place] medical records  [id number]        Your Vitals Were     Pulse Temperature Respirations Height Pulse Oximetry BMI (Body Mass Index)    85 97.8  F (36.6  C) (Oral) 12 1.651 m (5' 5\") 99% 30.02 kg/m2       Blood Pressure from Last 3 Encounters:   03/02/17 130/86   02/02/17 (!) 150/96   12/29/16 (!) 150/98    Weight from Last 3 Encounters:   03/02/17 81.8 kg (180 lb 6.4 oz)   02/02/17 79.9 kg (176 lb 1.6 oz)   12/29/16 80.2 kg (176 lb 14.4 oz)              We Performed the Following     Basic metabolic panel          Today's Medication Changes          These changes are accurate as of: 3/2/17  9:01 AM.  If you have any questions, ask your nurse or doctor.               These medicines have changed or have updated prescriptions.        Dose/Directions    * losartan 50 MG tablet   Commonly known as:  COZAAR   This may have changed:  Another medication with the same name was added. Make sure you understand how and when to take each.   Used for:  Essential hypertension with goal blood pressure less than 140/90   Changed by:  Izabella Maldonado PA-C        Dose:  50 mg   Take 1 tablet (50 mg) by mouth daily   Quantity:  30 tablet   Refills:  1       * losartan 100 MG tablet   Commonly known as:  COZAAR   This may have changed:  You were already taking a medication with the same name, and this prescription was added. Make sure you understand how and when to take each.   Used for:  Essential hypertension with goal blood pressure less than 140/90   Changed by:  Joel" Izabella TENA PA-C        Dose:  100 mg   Take 1 tablet (100 mg) by mouth daily   Quantity:  30 tablet   Refills:  1       magnesium oxide 400 (241.3 MG) MG tablet   Commonly known as:  MAG-OX   This may have changed:  when to take this   Used for:  Low magnesium levels        Dose:  400 mg   Take 1 tablet (400 mg) by mouth 2 times daily   Quantity:  60 tablet   Refills:  11       * Notice:  This list has 2 medication(s) that are the same as other medications prescribed for you. Read the directions carefully, and ask your doctor or other care provider to review them with you.         Where to get your medicines      These medications were sent to [id number] - [place], [place] - [address] AT [facility] [address]  [address], [facility] [address]     Phone:  [phone]     losartan 100 MG tablet                Primary Care Provider Office Phone #    [facility] [phone]       No address on file        Thank you!     Thank you for choosing [facility]  for your care. Our goal is always to provide you with excellent care. Hearing back from our patients is one way we can continue to improve our services. Please take a few minutes to complete the written survey that you may receive in the mail after your visit with us. Thank you!             Your Updated Medication List - Protect others around you: Learn how to safely use, store and throw away your medicines at www.disposemymeds.org.          This list is accurate as of: 3/2/17  9:01 AM.  Always use your most recent med list.                   Brand Name Dispense Instructions for use    calcium-vitamin D 600-400 MG-UNIT per tablet    CALTRATE     Take 1 tablet by mouth 2 times daily       * losartan 50 MG tablet    COZAAR    30 tablet    Take 1 tablet (50 mg) by mouth daily       * losartan 100 MG tablet    COZAAR    30 tablet    Take 1 tablet (100 mg) by mouth daily       magnesium oxide  400 (241.3 MG) MG tablet    MAG-OX    60 tablet    Take 1 tablet (400 mg) by mouth 2 times daily       Multi-vitamin Tabs tablet      Take 1 tablet by mouth daily prenatal       OSTEO BI-FLEX ADV DOUBLE ST PO      Take by mouth daily       priLOSEC 20 MG CR capsule   Generic drug:  omeprazole      Take 20 mg by mouth daily       tacrolimus 1 MG capsule    PROGRAF - GENERIC EQUIVALENT    240 capsule    Take 4 capsules (4 mg) by mouth every 12 hours       * Notice:  This list has 2 medication(s) that are the same as other medications prescribed for you. Read the directions carefully, and ask your doctor or other care provider to review them with you.

## 2017-03-03 LAB — AFP SERPL-MCNC: 1.7 UG/L (ref 0–8)

## 2017-03-10 ENCOUNTER — OFFICE VISIT (OUTPATIENT)
Dept: GASTROENTEROLOGY | Facility: CLINIC | Age: 67
End: 2017-03-10
Attending: INTERNAL MEDICINE
Payer: MEDICARE

## 2017-03-10 VITALS
DIASTOLIC BLOOD PRESSURE: 88 MMHG | WEIGHT: 176.8 LBS | SYSTOLIC BLOOD PRESSURE: 138 MMHG | HEIGHT: 65 IN | OXYGEN SATURATION: 99 % | HEART RATE: 87 BPM | TEMPERATURE: 97.7 F | BODY MASS INDEX: 29.46 KG/M2

## 2017-03-10 DIAGNOSIS — Z94.4 LIVER REPLACED BY TRANSPLANT (H): Primary | ICD-10-CM

## 2017-03-10 PROCEDURE — 99212 OFFICE O/P EST SF 10 MIN: CPT | Mod: ZF

## 2017-03-10 ASSESSMENT — PAIN SCALES - GENERAL: PAINLEVEL: NO PAIN (0)

## 2017-03-10 NOTE — PROGRESS NOTES
HISTORY OF PRESENT ILLNESS:  I had the pleasure of seeing Joselyn Leigh for followup in the Liver Transplantation Clinic at the [facility] on 03/10/2017.  Ms. Leigh returns now 9 months status post liver transplantation for cirrhosis caused by nonalcoholic fatty liver disease.  It was also complicated by hepatocellular carcinoma.      For the most part, she is doing fairly well.  She does complain of some abdominal bloating and does complain of some meal stimulated diarrhea.  She probably has 3-4 bowel movements per day.  They tend to occur mostly in the morning.  She denies any itching or skin rash.  She has improving fatigue.      She denies any fevers or chills, cough or shortness of breath.  She denies any nausea or vomiting.  Her appetite has been good and her weight has remained relatively stable.  She still has pain in her right knee and complains of pain in her back as well.       Current Outpatient Prescriptions   Medication     losartan (COZAAR) 100 MG tablet     Misc Natural Products (OSTEO BI-FLEX ADV DOUBLE ST PO)     tacrolimus (PROGRAF - GENERIC EQUIVALENT) 1 MG capsule     magnesium oxide (MAG-OX) 400 (241.3 MG) MG tablet     calcium-vitamin D (CALTRATE) 600-400 MG-UNIT per tablet     omeprazole (PRILOSEC) 20 MG capsule     multivitamin, therapeutic with minerals (MULTI-VITAMIN) TABS     No current facility-administered medications for this visit.      B/P: 138/88, T: 97.7, P: 87, R: Data Unavailable    HEENT exam shows no scleral icterus and no temporal muscle wasting.  Chest is clear.  Abdominal exam shows no increase in girth.  No masses or tenderness to palpation are present.  Her incision is intact.  Her liver is 10 cm in span without left lobe enlargement.  No spleen tip is palpable.  Extremity exam shows no edema.  Skin exam shows no stigmata of chronic liver disease and there are no suspicious lesions.       Her most recent laboratory tests show her white count is  3.1, hemoglobin 12, platelets are 145,000, sodium is 142, potassium 4.5, BUN 30, creatinine 0.8.  AST is 12, ALT is 20, alkaline phosphatase is 111, albumin is 3.6 with total protein of 7.1, total bilirubin is 0.6.  Her tacrolimus level was 6.9.        She did have a CT of her chest and abdomen today which will be reviewed at our Tumor Conference next week.      My impression is that Ms. Leigh is 10 months status post liver transplantation and in the big picture, is really doing quite well.  She is going to see somebody about her knee to try to get that looked at.  I have encouraged her to continue to be as active as possible.  She will need to watch her weight at this point in time as weight gain is going to be an increasingly important problem and I will see her back in the clinic again in 3 months.      Thank you very much for allowing me to participate in the care of this patient.  If you have any questions regarding my recommendations, please do not hesitate to contact me.         Chuck Mata MD      Professor of Medicine  [employer]      Executive Medical Director, Solid Organ Transplant Program  [facility]

## 2017-03-10 NOTE — LETTER
3/10/2017      RE: Joselyn Leigh  [address] [facility] [address]       HISTORY OF PRESENT ILLNESS:  I had the pleasure of seeing Joselyn Leigh for followup in the Liver Transplantation Clinic at the [facility] on 03/10/2017.  Ms. Leigh returns now 9 months status post liver transplantation for cirrhosis caused by nonalcoholic fatty liver disease.  It was also complicated by hepatocellular carcinoma.      For the most part, she is doing fairly well.  She does complain of some abdominal bloating and does complain of some meal stimulated diarrhea.  She probably has 3-4 bowel movements per day.  They tend to occur mostly in the morning.  She denies any itching or skin rash.  She has improving fatigue.      She denies any fevers or chills, cough or shortness of breath.  She denies any nausea or vomiting.  Her appetite has been good and her weight has remained relatively stable.  She still has pain in her right knee and complains of pain in her back as well.       Current Outpatient Prescriptions   Medication     losartan (COZAAR) 100 MG tablet     Misc Natural Products (OSTEO BI-FLEX ADV DOUBLE ST PO)     tacrolimus (PROGRAF - GENERIC EQUIVALENT) 1 MG capsule     magnesium oxide (MAG-OX) 400 (241.3 MG) MG tablet     calcium-vitamin D (CALTRATE) 600-400 MG-UNIT per tablet     omeprazole (PRILOSEC) 20 MG capsule     multivitamin, therapeutic with minerals (MULTI-VITAMIN) TABS     No current facility-administered medications for this visit.      B/P: 138/88, T: 97.7, P: 87, R: Data Unavailable    HEENT exam shows no scleral icterus and no temporal muscle wasting.  Chest is clear.  Abdominal exam shows no increase in girth.  No masses or tenderness to palpation are present.  Her incision is intact.  Her liver is 10 cm in span without left lobe enlargement.  No spleen tip is palpable.  Extremity exam shows no edema.  Skin exam shows no stigmata of chronic liver disease and there  are no suspicious lesions.       Her most recent laboratory tests show her white count is 3.1, hemoglobin 12, platelets are 145,000, sodium is 142, potassium 4.5, BUN 30, creatinine 0.8.  AST is 12, ALT is 20, alkaline phosphatase is 111, albumin is 3.6 with total protein of 7.1, total bilirubin is 0.6.  Her tacrolimus level was 6.9.        She did have a CT of her chest and abdomen today which will be reviewed at our Tumor Conference next week.      My impression is that Ms. Leigh is 10 months status post liver transplantation and in the big picture, is really doing quite well.  She is going to see somebody about her knee to try to get that looked at.  I have encouraged her to continue to be as active as possible.  She will need to watch her weight at this point in time as weight gain is going to be an increasingly important problem and I will see her back in the clinic again in 3 months.      Thank you very much for allowing me to participate in the care of this patient.  If you have any questions regarding my recommendations, please do not hesitate to contact me.         Chuck Mata MD      Professor of Medicine  [employer]      Executive Medical Director, Solid Organ Transplant Program  [facility]

## 2017-03-10 NOTE — NURSING NOTE
Chief Complaint   Patient presents with     RECHECK     Post Liver TXP   Pt roomed, vitals, meds, and allergies reviewed with pt. Pt ready for provider.  Pierre Adams, CMA

## 2017-03-10 NOTE — MR AVS SNAPSHOT
After Visit Summary   3/10/2017    Joselyn Leigh    MRN: [id number]           Patient Information     Date Of Birth          1950        Visit Information        Provider Department      3/10/2017 9:00 AM Chuck Mata MD [facility] Hepatology        Today's Diagnoses     Liver replaced by transplant (H)    -  1       Follow-ups after your visit        Your next 10 appointments already scheduled     Mar 13, 2017  8:15 AM CDT   (Arrive by 8:00 AM)   Return Visit with Brian Ozuna MD   [facility] ([facility])    [address] [place] [id number] [phone]            Apr 03, 2017  9:00 AM CDT   LAB with [facility] LAB ONLY   [facility]    [address] [place] [id number] [phone]           Patient must bring picture ID.  Patient should be prepared to give a urine specimen  Please do not eat 10-12 hours before your appointment if you are coming in fasting for labs on lipids, cholesterol, or glucose (sugar).  Pregnant women should follow their Care Team instructions. Water with medications is okay. Do not drink coffee or other fluids.   If you have concerns about taking  your medications, please ask at office or if scheduling via [employer], send a message by clicking on Secure Messaging, Message Your Care Team.            Jun 09, 2017  9:00 AM CDT   (Arrive by 8:45 AM)   Return Liver Transplant with Chuck Mata MD   [facility] Hepatology ([facility])    [address] [place] [id number] [phone]              Who to contact     If you have questions or need follow up information about today's clinic visit or your schedule please contact [facility] HEPATOLOGY directly at [phone].  Normal or non-critical lab and imaging results will be communicated to you by [employer], letter or phone within 4 business days  "after the clinic has received the results. If you do not hear from us within 7 days, please contact the clinic through Roomtag or phone. If you have a critical or abnormal lab result, we will notify you by phone as soon as possible.  Submit refill requests through Roomtag or call your pharmacy and they will forward the refill request to us. Please allow 3 business days for your refill to be completed.          Additional Information About Your Visit        Roomtag Information     Room[employer] gives you secure access to your electronic health record. If you see a primary care provider, you can also send messages to your care team and make appointments. If you have questions, please call your primary care clinic.  If you do not have a primary care provider, please call [phone] and they will assist you.        Care EveryWhere ID     This is your Care EveryWhere ID. This could be used by other organizations to access your [place] medical records  [id number]        Your Vitals Were     Pulse Temperature Height Pulse Oximetry BMI (Body Mass Index)       87 97.7  F (36.5  C) (Oral) 1.645 m (5' 4.75\") 99% 29.65 kg/m2        Blood Pressure from Last 3 Encounters:   03/10/17 138/88   03/02/17 130/86   02/02/17 (!) 150/96    Weight from Last 3 Encounters:   03/10/17 80.2 kg (176 lb 12.8 oz)   03/02/17 81.8 kg (180 lb 6.4 oz)   02/02/17 79.9 kg (176 lb 1.6 oz)              Today, you had the following     No orders found for display         Today's Medication Changes          These changes are accurate as of: 3/10/17 11:59 PM.  If you have any questions, ask your nurse or doctor.               These medicines have changed or have updated prescriptions.        Dose/Directions    magnesium oxide 400 (241.3 MG) MG tablet   Commonly known as:  MAG-OX   This may have changed:  when to take this   Used for:  Low magnesium levels        Dose:  400 mg   Take 1 tablet (400 mg) by mouth 2 times daily   Quantity:  60 tablet   Refills:  " 11                Primary Care Provider Office Phone #    [facility] [phone]       No address on file        Thank you!     Thank you for choosing [facility] HEPATOLOG[facility]  for your care. Our goal is always to provide you with excellent care. Hearing back from our patients is one way we can continue to improve our services. Please take a few minutes to complete the written survey that you may receive in the mail after your visit with us. Thank you!             Your Updated Medication List - Protect others around you: Learn how to safely use, store and throw away your medicines at www.disposemymeds.org.          This list is accurate as of: 3/10/17 11:59 PM.  Always use your most recent med list.                   Brand Name Dispense Instructions for use    calcium-vitamin D 600-400 MG-UNIT per tablet    CALTRATE     Take 1 tablet by mouth 2 times daily       losartan 100 MG tablet    COZAAR    30 tablet    Take 1 tablet (100 mg) by mouth daily       magnesium oxide 400 (241.3 MG) MG tablet    MAG-OX    60 tablet    Take 1 tablet (400 mg) by mouth 2 times daily       Multi-vitamin Tabs tablet      Take 1 tablet by mouth daily prenatal       OSTEO BI-FLEX ADV DOUBLE ST PO      Take by mouth daily       priLOSEC 20 MG CR capsule   Generic drug:  omeprazole      Take 20 mg by mouth daily       tacrolimus 1 MG capsule    PROGRAF - GENERIC EQUIVALENT    240 capsule    Take 4 capsules (4 mg) by mouth every 12 hours

## 2017-03-13 ENCOUNTER — ONCOLOGY VISIT (OUTPATIENT)
Dept: ONCOLOGY | Facility: CLINIC | Age: 67
End: 2017-03-13
Attending: INTERNAL MEDICINE
Payer: COMMERCIAL

## 2017-03-13 VITALS
HEIGHT: 65 IN | HEART RATE: 79 BPM | TEMPERATURE: 97.9 F | SYSTOLIC BLOOD PRESSURE: 133 MMHG | BODY MASS INDEX: 29.99 KG/M2 | DIASTOLIC BLOOD PRESSURE: 84 MMHG | RESPIRATION RATE: 16 BRPM | OXYGEN SATURATION: 99 % | WEIGHT: 180 LBS

## 2017-03-13 DIAGNOSIS — C22.0 HCC (HEPATOCELLULAR CARCINOMA) (H): Primary | ICD-10-CM

## 2017-03-13 DIAGNOSIS — K74.60 CIRRHOSIS OF LIVER WITHOUT ASCITES, UNSPECIFIED HEPATIC CIRRHOSIS TYPE (H): ICD-10-CM

## 2017-03-13 DIAGNOSIS — B18.2 CHRONIC HEPATITIS C WITHOUT HEPATIC COMA (H): ICD-10-CM

## 2017-03-13 PROCEDURE — 99215 OFFICE O/P EST HI 40 MIN: CPT | Mod: ZP | Performed by: INTERNAL MEDICINE

## 2017-03-13 PROCEDURE — 99212 OFFICE O/P EST SF 10 MIN: CPT | Mod: ZF

## 2017-03-13 ASSESSMENT — PAIN SCALES - GENERAL: PAINLEVEL: NO PAIN (0)

## 2017-03-13 NOTE — PROGRESS NOTES
HISTORY OF PRESENT ILLNESS:  Joselyn Leigh is here today in followup of hepatocellular carcinoma.  She is now a little less than a year out from a liver transplant.  She had bridging therapy pre-transplant of a single recognized lesion that was about 3.8 cm.  On her explant, she had a second smaller lesion.  She tells me today that she feels uncomfortable because her belly feels full all the time, and she fills up fairly easily when she eats.  In spite of that, she has actually gained a little bit of weight.  She is very concerned that her bowels were put back in the wrong way after her transplant.  She otherwise feels reasonably well.  She does not get much in the way of exercise at all.  The remainder of her 10-point review of systems is otherwise unremarkable.      PHYSICAL EXAMINATION:   GENERAL:  Ms. Leigh is alert.  She appears well.   VITAL SIGNS:  As noted in the chart.   HEENT:  She has no icterus.  She has no visible lesions in the oropharynx.   NECK:  No adenopathy is palpable in the neck or supraclavicular spaces.   LUNGS:  Clear to auscultation without dullness to percussion.   HEART:  Rate and rhythm are regular without audible murmur or gallop.   ABDOMEN:  Soft and nontender, without palpable mass, organomegaly or demonstrable ascites.   EXTREMITIES:  She has no peripheral edema.  She has mild tenderness of her calves and thighs in both legs, but no discretely palpable cords or focal point tenderness or erythema.   NEUROLOGIC:  Her speech is fluent.  Her cranial nerves are grossly intact.      RESULTS:  I reviewed with the patient and her  her lab work and CT scan.  She has normal electrolytes, normal renal function, normal liver enzymes, mild cytopenias with a white count of 3.1, hemoglobin 12 and platelets of 145,000.  Her alpha-fetoprotein level is normal.  On her CT scan, she has nothing to suggest recurrence of her disease.      ASSESSMENT:  History of hepatocellular carcinoma.  Based on  her explant histology, she has about a 20-25% recurrence risk.  We will continue to follow up every 3-4 months.  She want to do it in 3 months when it coincides with her Transplant Hepatology visit.  I spent a long time today with her reviewing some basics of anatomy, showing her where her bowels are on the scan, and that they are, in fact, in the right place.  I reassured her that some of what she is experiencing is normal.  I think the thing that would probably help her the most is getting some more regular exercise.  I suggested a referral to physical therapy for an exercise prescription might be worthwhile, but she felt like she could work on this on her own.

## 2017-03-13 NOTE — NURSING NOTE
"Joselyn Leigh is a 67 year old female who presents for:  Chief Complaint   Patient presents with     Oncology Clinic Visit     Patient is seeing MD relating to post op        Initial Vitals:  /84 (BP Location: Left arm, Patient Position: Chair, Cuff Size: Adult Large)  Pulse 79  Temp 97.9  F (36.6  C) (Oral)  Resp 16  Ht 1.645 m (5' 4.75\")  Wt 81.6 kg (180 lb)  SpO2 99%  BMI 30.19 kg/m2 Estimated body mass index is 30.19 kg/(m^2) as calculated from the following:    Height as of this encounter: 1.645 m (5' 4.75\").    Weight as of this encounter: 81.6 kg (180 lb).. Body surface area is 1.93 meters squared. BP completed using cuff size: large  No Pain (0) No LMP recorded. Patient is not currently having periods (Reason: Premenopausal). Allergies and medications reviewed.     Medications: Medication refills not needed today.  Pharmacy name entered into [employer]:    [facility] [address] AT [facility] [address]  [facility] - [place], [place] - [address]    Comments: Patient is not in any pain today.    6 minutes for nursing intake (face to face time)   Sarah Kate LPN      "

## 2017-03-13 NOTE — MR AVS SNAPSHOT
After Visit Summary   3/13/2017    Joselyn Leigh    MRN: [id number]           Patient Information     Date Of Birth          1950        Visit Information        Provider Department      3/13/2017 8:15 AM Brian Ozuna MD [facility]        Today's Diagnoses     HCC (hepatocellular carcinoma) (H)    -  1    Cirrhosis of liver without ascites, unspecified hepatic cirrhosis type (H)        Chronic hepatitis C without hepatic coma (H)           Follow-ups after your visit        Follow-up notes from your care team     Return in about 3 months (around 6/13/2017) for NP Visit with CT and labs.      Your next 10 appointments already scheduled     Apr 03, 2017  9:00 AM CDT   [facility]    [address] [place] [address] [phone]           Patient must bring picture ID.  Patient should be prepared to give a urine specimen  Please do not eat 10-12 hours before your appointment if you are coming in fasting for labs on lipids, cholesterol, or glucose (sugar).  Pregnant women should follow their Care Team instructions. Water with medications is okay. Do not drink coffee or other fluids.   If you have concerns about taking  your medications, please ask at office or if scheduling via 9Flavat, send a message by clicking on Secure Messaging, Message Your Care Team.            Jun 09, 2017  9:00 AM CDT   (Arrive by 8:45 AM)   Return Liver Transplant with Chuck Mata MD   [facility] ([facility] [place])    [address] [facility] [address] [facility] [address]  3rd Floor  [facility] [address] [phone]            Jun 09, 2017 10:20 AM CDT   (Arrive by 10:05 AM)   CT CHEST ABDOMEN PELVIS W/O & W CONTRAST with UCCT1   [facility] CT ([facility] [place])    [address]  1st Floor  [facility] [address] [phone]           Please bring any scans  or X-rays taken at other hospitals, if similar tests were done. Also bring a list of your medicines, including vitamins, minerals and over-the-counter drugs. It is safest to leave personal items at home.  Be sure to tell your doctor:   If you have any allergies.   If there s any chance you are pregnant.   If you are breastfeeding.   If you have any special needs.  You may have contrast for this exam. To prepare:   Do not eat or drink for 2 hours before your exam. If you need to take medicine, you may take it with small sips of water. (We may ask you to take liquid medicine as well.)   The day before your exam, drink extra fluids at least six 8-ounce glasses (unless your doctor tells you to restrict your fluids).  Patients over 70 or patients with diabetes or kidney problems:   If you haven t had a blood test (creatinine test) within the last 30 days, go to your clinic or Diagnostic Imaging Department for this test.  If you have diabetes:   If your kidney function is normal, continue taking your metformin (Avandamet, Glucophage, Glucovance, Metaglip) on the day of your exam.   If your kidney function is abnormal, wait 48 hours before restarting this medicine.  You will have oral contrast for this exam:   You will drink the contrast at home. Get this from your clinic or Diagnostic Imaging Department. Please follow the directions given.  Please wear loose clothing, such as a sweat suit or jogging clothes. Avoid snaps, zippers and other metal. We may ask you to undress and put on a hospital gown.  If you have any questions, please call the Imaging Department where you will have your exam.            Jun 13, 2017 10:30 AM CDT   (Arrive by 10:15 AM)   Return Visit with BETO Forman [facility] ([facility])    [address]  2nd Floor  [facility] [address] [phone]              Future tests that were ordered for you today     Open Future Orders      "   Priority Expected Expires Ordered    CT Chest Abdomen Pelvis w/o & w Contrast Routine 6/12/2017 8/13/2017 3/13/2017    Comprehensive metabolic panel Routine 6/12/2017 8/13/2017 3/13/2017    CBC with platelets differential Routine 6/12/2017 8/13/2017 3/13/2017    AFP tumor marker Routine 6/12/2017 8/13/2017 3/13/2017            Who to contact     If you have questions or need follow up information about today's clinic visit or your schedule please contact [facility] directly at [phone].  Normal or non-critical lab and imaging results will be communicated to you by AdventureD[employer]art, letter or phone within 4 business days after the clinic has received the results. If you do not hear from us within 7 days, please contact the clinic through Jianshut or phone. If you have a critical or abnormal lab result, we will notify you by phone as soon as possible.  Submit refill requests through Sh[employer]o or call your pharmacy and they will forward the refill request to us. Please allow 3 business days for your refill to be completed.          Additional Information About Your Visit        Adventure[employer] Information     [employer] gives you secure access to your electronic health record. If you see a primary care provider, you can also send messages to your care team and make appointments. If you have questions, please call your primary care clinic.  If you do not have a primary care provider, please call [phone] and they will assist you.        Care EveryWhere ID     This is your Care EveryWhere ID. This could be used by other organizations to access your [facility] medical records  [id number]        Your Vitals Were     Pulse Temperature Respirations Height Pulse Oximetry BMI (Body Mass Index)    79 97.9  F (36.6  C) (Oral) 16 1.645 m (5' 4.75\") 99% 30.19 kg/m2       Blood Pressure from Last 3 Encounters:   03/13/17 133/84   03/10/17 138/88   03/02/17 130/86    Weight from Last 3 Encounters:   03/13/17 81.6 kg (180 lb) "   03/10/17 80.2 kg (176 lb 12.8 oz)   03/02/17 81.8 kg (180 lb 6.4 oz)                 Today's Medication Changes          These changes are accurate as of: 3/13/17  8:46 AM.  If you have any questions, ask your nurse or doctor.               These medicines have changed or have updated prescriptions.        Dose/Directions    magnesium oxide 400 (241.3 MG) MG tablet   Commonly known as:  MAG-OX   This may have changed:  when to take this   Used for:  Low magnesium levels        Dose:  400 mg   Take 1 tablet (400 mg) by mouth 2 times daily   Quantity:  60 tablet   Refills:  11                Primary Care Provider Office Phone #    [facility] [phone]       No address on file        Thank you!     Thank you for choosing [facility]  for your care. Our goal is always to provide you with excellent care. Hearing back from our patients is one way we can continue to improve our services. Please take a few minutes to complete the written survey that you may receive in the mail after your visit with us. Thank you!             Your Updated Medication List - Protect others around you: Learn how to safely use, store and throw away your medicines at www.disposemymeds.org.          This list is accurate as of: 3/13/17  8:46 AM.  Always use your most recent med list.                   Brand Name Dispense Instructions for use    calcium-vitamin D 600-400 MG-UNIT per tablet    CALTRATE     Take 1 tablet by mouth 2 times daily       losartan 100 MG tablet    COZAAR    30 tablet    Take 1 tablet (100 mg) by mouth daily       magnesium oxide 400 (241.3 MG) MG tablet    MAG-OX    60 tablet    Take 1 tablet (400 mg) by mouth 2 times daily       Multi-vitamin Tabs tablet      Take 1 tablet by mouth daily prenatal       OSTEO BI-FLEX ADV DOUBLE ST PO      Take by mouth daily       priLOSEC 20 MG CR capsule   Generic drug:  omeprazole      Take 20 mg by mouth daily       tacrolimus 1 MG capsule     PROGRAF - GENERIC EQUIVALENT    240 capsule    Take 4 capsules (4 mg) by mouth every 12 hours

## 2017-03-22 DIAGNOSIS — I10 ESSENTIAL HYPERTENSION WITH GOAL BLOOD PRESSURE LESS THAN 140/90: ICD-10-CM

## 2017-03-22 NOTE — TELEPHONE ENCOUNTER
losartan (COZAAR) 100 MG tablet      Last Written Prescription Date: 3/2/17  Last Fill Quantity: 30 tablets, # refills: 1  Last Office Visit with G, P or [facility] prescribing provider: 3/10/17 Izabella Maldonado         Potassium   Date Value Ref Range Status   03/02/2017 4.5 3.4 - 5.3 mmol/L Final     Creatinine   Date Value Ref Range Status   03/02/2017 0.80 0.52 - 1.04 mg/dL Final     BP Readings from Last 3 Encounters:   03/13/17 133/84   03/10/17 138/88   03/02/17 130/86     Denice An

## 2017-03-22 NOTE — LETTER
[address]  [address]  [phone]    March 29, 2017      Joselyn Leigh  [address] [facility] [address]              Dear Joselyn,    We received your request for refill of your losartan.  According to our records you should have one refill.  Follow up with us in clinic is needed (prior to additional refills) to recheck your labs since we increased your dose and make sure your blood pressure is at goal.   Please call or MyChart my office with any questions or concerns.        Sincerely,    Izabella Maldonado PA-C

## 2017-03-24 NOTE — TELEPHONE ENCOUNTER
Should have one refill.  Given 30 tablets cozaar with one refill on 3/2/17.  Needs follow up with me prior to additional refills.

## 2017-03-24 NOTE — TELEPHONE ENCOUNTER
Routing refill request to provider for review/approval because:  Patient is due for follow up next month- medication was increased. Please review and advise.    SANDRA Nuñez, Clinical RN Prabha Correa.

## 2017-03-27 NOTE — TELEPHONE ENCOUNTER
Aspen Maldonado contacted Joselyn on 03/27/17 and left a message. If patient calls back please relay message below.    Aspen WHATLEY, Patient Care

## 2017-03-28 NOTE — TELEPHONE ENCOUNTER
Aspen Maldonado contacted Joselyn on 03/28/17 and left a message. If patient calls back please see below.    Aspen WHATLEY, Patient Care

## 2017-03-29 RX ORDER — LOSARTAN POTASSIUM 100 MG/1
100 TABLET ORAL DAILY
Qty: 30 TABLET | Refills: 1 | OUTPATIENT
Start: 2017-03-29

## 2017-03-29 NOTE — TELEPHONE ENCOUNTER
LM for pt to call clinic.  3rd attempt, with no response.  Please advise.      Aspen WHATLEY, Patient Care

## 2017-04-03 DIAGNOSIS — Z79.60 LONG-TERM USE OF IMMUNOSUPPRESSANT MEDICATION: ICD-10-CM

## 2017-04-03 DIAGNOSIS — Z94.4 LIVER REPLACED BY TRANSPLANT (H): ICD-10-CM

## 2017-04-03 LAB
ALBUMIN SERPL-MCNC: 3.5 G/DL (ref 3.4–5)
ALP SERPL-CCNC: 118 U/L (ref 40–150)
ALT SERPL W P-5'-P-CCNC: 24 U/L (ref 0–50)
ANION GAP SERPL CALCULATED.3IONS-SCNC: 8 MMOL/L (ref 3–14)
AST SERPL W P-5'-P-CCNC: 14 U/L (ref 0–45)
BILIRUB DIRECT SERPL-MCNC: 0.1 MG/DL (ref 0–0.2)
BILIRUB SERPL-MCNC: 0.5 MG/DL (ref 0.2–1.3)
BUN SERPL-MCNC: 24 MG/DL (ref 7–30)
CALCIUM SERPL-MCNC: 9.1 MG/DL (ref 8.5–10.1)
CHLORIDE SERPL-SCNC: 105 MMOL/L (ref 94–109)
CO2 SERPL-SCNC: 29 MMOL/L (ref 20–32)
CREAT SERPL-MCNC: 0.94 MG/DL (ref 0.52–1.04)
ERYTHROCYTE [DISTWIDTH] IN BLOOD BY AUTOMATED COUNT: 14 % (ref 10–15)
GFR SERPL CREATININE-BSD FRML MDRD: 59 ML/MIN/1.7M2
GLUCOSE SERPL-MCNC: 138 MG/DL (ref 70–99)
HCT VFR BLD AUTO: 34.6 % (ref 35–47)
HGB BLD-MCNC: 11.9 G/DL (ref 11.7–15.7)
MAGNESIUM SERPL-MCNC: 1.8 MG/DL (ref 1.6–2.3)
MCH RBC QN AUTO: 29.8 PG (ref 26.5–33)
MCHC RBC AUTO-ENTMCNC: 34.4 G/DL (ref 31.5–36.5)
MCV RBC AUTO: 87 FL (ref 78–100)
PHOSPHATE SERPL-MCNC: 4.3 MG/DL (ref 2.5–4.5)
PLATELET # BLD AUTO: 154 10E9/L (ref 150–450)
POTASSIUM SERPL-SCNC: 5.2 MMOL/L (ref 3.4–5.3)
PROT SERPL-MCNC: 7 G/DL (ref 6.8–8.8)
RBC # BLD AUTO: 4 10E12/L (ref 3.8–5.2)
SODIUM SERPL-SCNC: 142 MMOL/L (ref 133–144)
WBC # BLD AUTO: 3.4 10E9/L (ref 4–11)

## 2017-04-03 PROCEDURE — 80076 HEPATIC FUNCTION PANEL: CPT | Performed by: INTERNAL MEDICINE

## 2017-04-03 PROCEDURE — 36415 COLL VENOUS BLD VENIPUNCTURE: CPT | Performed by: INTERNAL MEDICINE

## 2017-04-03 PROCEDURE — 85027 COMPLETE CBC AUTOMATED: CPT | Performed by: INTERNAL MEDICINE

## 2017-04-03 PROCEDURE — 84100 ASSAY OF PHOSPHORUS: CPT | Performed by: INTERNAL MEDICINE

## 2017-04-03 PROCEDURE — 83735 ASSAY OF MAGNESIUM: CPT | Performed by: INTERNAL MEDICINE

## 2017-04-03 PROCEDURE — 80048 BASIC METABOLIC PNL TOTAL CA: CPT | Performed by: INTERNAL MEDICINE

## 2017-04-03 PROCEDURE — 80197 ASSAY OF TACROLIMUS: CPT | Performed by: INTERNAL MEDICINE

## 2017-04-04 LAB
TACROLIMUS BLD-MCNC: 8.8 UG/L (ref 5–15)
TME LAST DOSE: NORMAL H

## 2017-04-27 ENCOUNTER — MYC MEDICAL ADVICE (OUTPATIENT)
Dept: FAMILY MEDICINE | Facility: CLINIC | Age: 67
End: 2017-04-27

## 2017-04-27 DIAGNOSIS — I10 ESSENTIAL HYPERTENSION WITH GOAL BLOOD PRESSURE LESS THAN 140/90: ICD-10-CM

## 2017-04-27 NOTE — TELEPHONE ENCOUNTER
Reason for Call:  Medication or medication refill:    Do you use a [facility]?  Name of the pharmacy and phone number for the current request:  [facility] - [phone]    Name of the medication requested: Losartan (COZAAR) 100 MG tablet    Can we leave a detailed message on this number? YES    Phone number patient can be reached at: Home number on file [phone] (home)    Best Time: Anytime    Call taken on 4/27/2017 at 11:00 AM by Omi Mcginnis

## 2017-04-28 RX ORDER — LOSARTAN POTASSIUM 100 MG/1
100 TABLET ORAL DAILY
Qty: 30 TABLET | Refills: 0 | Status: SHIPPED | OUTPATIENT
Start: 2017-04-28 | End: 2017-05-01

## 2017-04-28 NOTE — TELEPHONE ENCOUNTER
Losartan      Last Written Prescription Date: 03/02/17  Last Fill Quantity: 30, # refills: 1  Last Office Visit with [facility] or [facility] prescribing provider: 03/02/17       Potassium   Date Value Ref Range Status   04/03/2017 5.2 3.4 - 5.3 mmol/L Final     Creatinine   Date Value Ref Range Status   04/03/2017 0.94 0.52 - 1.04 mg/dL Final     BP Readings from Last 3 Encounters:   03/13/17 133/84   03/10/17 138/88   03/02/17 130/86       Medication is being filled for 1 time refill only due to:  Patient needs to be seen because Dur for office visit.   Gabriela Modi RN

## 2017-04-28 NOTE — TELEPHONE ENCOUNTER
Medication is being filled for 1 time refill only due to:  Patient needs to be seen because due for office visit.     See refill encounter from 04/27/17.    Gabriela Modi RN

## 2017-05-01 ENCOUNTER — OFFICE VISIT (OUTPATIENT)
Dept: FAMILY MEDICINE | Facility: CLINIC | Age: 67
End: 2017-05-01
Payer: COMMERCIAL

## 2017-05-01 VITALS
HEART RATE: 80 BPM | BODY MASS INDEX: 30.4 KG/M2 | TEMPERATURE: 98 F | WEIGHT: 181.3 LBS | OXYGEN SATURATION: 100 % | SYSTOLIC BLOOD PRESSURE: 122 MMHG | DIASTOLIC BLOOD PRESSURE: 74 MMHG

## 2017-05-01 DIAGNOSIS — Z79.60 LONG-TERM USE OF IMMUNOSUPPRESSANT MEDICATION: ICD-10-CM

## 2017-05-01 DIAGNOSIS — Z94.4 LIVER REPLACED BY TRANSPLANT (H): ICD-10-CM

## 2017-05-01 DIAGNOSIS — D64.9 ANEMIA, UNSPECIFIED TYPE: ICD-10-CM

## 2017-05-01 DIAGNOSIS — R53.83 OTHER FATIGUE: ICD-10-CM

## 2017-05-01 DIAGNOSIS — M25.569 KNEE PAIN, UNSPECIFIED CHRONICITY, UNSPECIFIED LATERALITY: ICD-10-CM

## 2017-05-01 DIAGNOSIS — R73.9 HYPERGLYCEMIA: ICD-10-CM

## 2017-05-01 DIAGNOSIS — I10 ESSENTIAL HYPERTENSION WITH GOAL BLOOD PRESSURE LESS THAN 140/90: Primary | ICD-10-CM

## 2017-05-01 LAB
ALBUMIN SERPL-MCNC: 3.4 G/DL (ref 3.4–5)
ALP SERPL-CCNC: 130 U/L (ref 40–150)
ALT SERPL W P-5'-P-CCNC: 35 U/L (ref 0–50)
ANION GAP SERPL CALCULATED.3IONS-SCNC: 9 MMOL/L (ref 3–14)
AST SERPL W P-5'-P-CCNC: 13 U/L (ref 0–45)
BILIRUB DIRECT SERPL-MCNC: <0.1 MG/DL (ref 0–0.2)
BILIRUB SERPL-MCNC: 0.4 MG/DL (ref 0.2–1.3)
BUN SERPL-MCNC: 35 MG/DL (ref 7–30)
CALCIUM SERPL-MCNC: 9.2 MG/DL (ref 8.5–10.1)
CHLORIDE SERPL-SCNC: 107 MMOL/L (ref 94–109)
CO2 SERPL-SCNC: 28 MMOL/L (ref 20–32)
CREAT SERPL-MCNC: 0.9 MG/DL (ref 0.52–1.04)
ERYTHROCYTE [DISTWIDTH] IN BLOOD BY AUTOMATED COUNT: 13.6 % (ref 10–15)
FERRITIN SERPL-MCNC: 34 NG/ML (ref 8–252)
GFR SERPL CREATININE-BSD FRML MDRD: 62 ML/MIN/1.7M2
GLUCOSE SERPL-MCNC: 134 MG/DL (ref 70–99)
HBA1C MFR BLD: 5.5 % (ref 4.3–6)
HCT VFR BLD AUTO: 34.2 % (ref 35–47)
HGB BLD-MCNC: 11.6 G/DL (ref 11.7–15.7)
MAGNESIUM SERPL-MCNC: 2.4 MG/DL (ref 1.6–2.3)
MCH RBC QN AUTO: 30.1 PG (ref 26.5–33)
MCHC RBC AUTO-ENTMCNC: 33.9 G/DL (ref 31.5–36.5)
MCV RBC AUTO: 89 FL (ref 78–100)
PHOSPHATE SERPL-MCNC: 4.4 MG/DL (ref 2.5–4.5)
PLATELET # BLD AUTO: 156 10E9/L (ref 150–450)
POTASSIUM SERPL-SCNC: 5.5 MMOL/L (ref 3.4–5.3)
PROT SERPL-MCNC: 7.1 G/DL (ref 6.8–8.8)
RBC # BLD AUTO: 3.86 10E12/L (ref 3.8–5.2)
SODIUM SERPL-SCNC: 144 MMOL/L (ref 133–144)
TACROLIMUS BLD-MCNC: 6.8 UG/L (ref 5–15)
TME LAST DOSE: NORMAL H
TSH SERPL DL<=0.005 MIU/L-ACNC: 1.32 MU/L (ref 0.4–4)
WBC # BLD AUTO: 3 10E9/L (ref 4–11)

## 2017-05-01 PROCEDURE — 84100 ASSAY OF PHOSPHORUS: CPT | Performed by: INTERNAL MEDICINE

## 2017-05-01 PROCEDURE — 80197 ASSAY OF TACROLIMUS: CPT | Performed by: INTERNAL MEDICINE

## 2017-05-01 PROCEDURE — 83735 ASSAY OF MAGNESIUM: CPT | Performed by: INTERNAL MEDICINE

## 2017-05-01 PROCEDURE — 85027 COMPLETE CBC AUTOMATED: CPT | Performed by: INTERNAL MEDICINE

## 2017-05-01 PROCEDURE — 84443 ASSAY THYROID STIM HORMONE: CPT | Performed by: FAMILY MEDICINE

## 2017-05-01 PROCEDURE — 80048 BASIC METABOLIC PNL TOTAL CA: CPT | Performed by: INTERNAL MEDICINE

## 2017-05-01 PROCEDURE — 83036 HEMOGLOBIN GLYCOSYLATED A1C: CPT | Performed by: FAMILY MEDICINE

## 2017-05-01 PROCEDURE — 99214 OFFICE O/P EST MOD 30 MIN: CPT | Performed by: FAMILY MEDICINE

## 2017-05-01 PROCEDURE — 36415 COLL VENOUS BLD VENIPUNCTURE: CPT | Performed by: FAMILY MEDICINE

## 2017-05-01 PROCEDURE — 80076 HEPATIC FUNCTION PANEL: CPT | Performed by: INTERNAL MEDICINE

## 2017-05-01 PROCEDURE — 82728 ASSAY OF FERRITIN: CPT | Performed by: FAMILY MEDICINE

## 2017-05-01 RX ORDER — LOSARTAN POTASSIUM 100 MG/1
100 TABLET ORAL DAILY
Qty: 30 TABLET | Refills: 5 | Status: SHIPPED | OUTPATIENT
Start: 2017-05-01 | End: 2017-11-20

## 2017-05-01 NOTE — PATIENT INSTRUCTIONS
Refill of the losartan.    Additional labs for fatigue symptoms.    Consider PHYSICAL THERAPY for knee pain.

## 2017-05-01 NOTE — PROGRESS NOTES
SUBJECTIVE:                                                    Joselyn Leigh is a 67 year old female who presents to clinic today for the following health issues:      Hypertension Follow-up      Outpatient blood pressures are being checked at home.  Results are 137/92.    Low Salt Diet: low salt       Amount of exercise or physical activity: 2-3 days/week for an average of 30-45 minutes  -  Total gym, exercise    Problems taking medications regularly: No    Medication side effects: none    Diet: low salt          Problem list and histories reviewed & adjusted, as indicated.  Additional history: as documented    BP Readings from Last 3 Encounters:   05/01/17 122/74   03/13/17 133/84   03/10/17 138/88    Wt Readings from Last 3 Encounters:   05/01/17 82.2 kg (181 lb 4.8 oz)   03/13/17 81.6 kg (180 lb)   03/10/17 80.2 kg (176 lb 12.8 oz)                    Reviewed and updated as needed this visit by clinical staff  Tobacco  Allergies  Meds  Med Hx  Surg Hx  Fam Hx  Soc Hx      Reviewed and updated as needed this visit by Provider  Tobacco  Allergies  Meds  Med Hx  Surg Hx  Fam Hx  Soc Hx        ROS:  Constitutional, HEENT, cardiovascular, pulmonary, GI, , musculoskeletal, neuro, skin, endocrine and psych systems are negative, except as otherwise noted.  Fatigue.  No pain symptoms.  No dizziness/lightheadedness.  Elevate BS on recent testing.  Past history of anemia noted.  Knee pain intermittently - worse with stairs. Present around knee cap.  No swelling no known injury.    Hemoglobin   Date Value Ref Range Status   05/01/2017 11.6 (L) 11.7 - 15.7 g/dL Final   ]      OBJECTIVE:                                                    /74 (BP Location: Right arm, Patient Position: Chair, Cuff Size: Adult Large)  Pulse 80  Temp 98  F (36.7  C) (Oral)  Wt 82.2 kg (181 lb 4.8 oz)  SpO2 100%  BMI 30.4 kg/m2  Body mass index is 30.4 kg/(m^2).  GENERAL: alert, no distress, obese and fatigued  NECK:  no adenopathy, no asymmetry, masses, or scars and thyroid normal to palpation  RESP: lungs clear to auscultation - no rales, rhonchi or wheezes  CV: regular rate and rhythm, normal S1 S2, no S3 or S4, no murmur, click or rub, no peripheral edema and peripheral pulses strong  MS: no gross musculoskeletal defects noted, no edema.  No effusion. No laxity joint.    BACK: no CVA tenderness, no paralumbar tenderness    Diagnostic Test Results:  Results for orders placed or performed in visit on 05/01/17   Ferritin   Result Value Ref Range    Ferritin 34 8 - 252 ng/mL   TSH with free T4 reflex   Result Value Ref Range    TSH 1.32 0.40 - 4.00 mU/L   Hemoglobin A1c   Result Value Ref Range    Hemoglobin A1C 5.5 4.3 - 6.0 %        ASSESSMENT/PLAN:                                                          1. Essential hypertension with goal blood pressure less than 140/90  Good control noted.    - losartan (COZAAR) 100 MG tablet; Take 1 tablet (100 mg) by mouth daily  Dispense: 30 tablet; Refill: 5    2. Other fatigue  Normal thyroid.    - TSH with free T4 reflex    3. Hyperglycemia  Normal.  - Hemoglobin A1c    4. Anemia, unspecified type  Recent hgb drifting down.  ?related to treatment for liver cancer.   - Ferritin    5.  Knee pain - home exercises vs PHYSICAL THERAPY discussed.  Consider PHYSICAL THERAPY if persistent symptoms.      Patient Instructions   Refill of the losartan.    Additional labs for fatigue symptoms.    Consider PHYSICAL THERAPY for knee pain.      Cherrie Peralta MD  [facility]

## 2017-05-01 NOTE — MR AVS SNAPSHOT
After Visit Summary   5/1/2017    Joselyn Leigh    MRN: [id number]           Patient Information     Date Of Birth          1950        Visit Information        Provider Department      5/1/2017 11:40 AM Cherrie Peralta MD [facility]        Today's Diagnoses     Other fatigue    -  1    Essential hypertension with goal blood pressure less than 140/90        Hyperglycemia        Anemia, unspecified type          Care Instructions    Refill of the losartan.    Additional labs for fatigue symptoms.    Consider PHYSICAL THERAPY for knee pain.        Follow-ups after your visit        Your next 10 appointments already scheduled     Jun 09, 2017  9:00 AM CDT   (Arrive by 8:45 AM)   Return Liver Transplant with Chuck Mata MD   [facility] ([facility]    [address]  3rd Floor  [facility] [address] [phone]            Jun 09, 2017 10:20 AM CDT   (Arrive by 10:05 AM)   CT CHEST ABDOMEN PELVIS W/O & W CONTRAST with UCCT1   [facility] ([facility])    [address]  [address] [facility] [address] [phone]           Please bring any scans or X-rays taken at other hospitals, if similar tests were done. Also bring a list of your medicines, including vitamins, minerals and over-the-counter drugs. It is safest to leave personal items at home.  Be sure to tell your doctor:   If you have any allergies.   If there s any chance you are pregnant.   If you are breastfeeding.   If you have any special needs.  You may have contrast for this exam. To prepare:   Do not eat or drink for 2 hours before your exam. If you need to take medicine, you may take it with small sips of water. (We may ask you to take liquid medicine as well.)   The day before your exam, drink extra fluids at least six 8-ounce glasses (unless your doctor tells you to restrict your fluids).  Patients over 70 or patients  with diabetes or kidney problems:   If you haven t had a blood test (creatinine test) within the last 30 days, go to your clinic or Diagnostic Imaging Department for this test.  If you have diabetes:   If your kidney function is normal, continue taking your metformin (Avandamet, Glucophage, Glucovance, Metaglip) on the day of your exam.   If your kidney function is abnormal, wait 48 hours before restarting this medicine.  You will have oral contrast for this exam:   You will drink the contrast at home. Get this from your clinic or Diagnostic Imaging Department. Please follow the directions given.  Please wear loose clothing, such as a sweat suit or jogging clothes. Avoid snaps, zippers and other metal. We may ask you to undress and put on a hospital gown.  If you have any questions, please call the Imaging Department where you will have your exam.            Jun 13, 2017 10:30 AM CDT   (Arrive by 10:15 AM)   Return Visit with BETO Forman [facility] ([facility] [place])    [address] [place] [address] [phone]              Who to contact     If you have questions or need follow up information about today's clinic visit or your schedule please contact [facility] directly at [phone].  Normal or non-critical lab and imaging results will be communicated to you by MyChart, letter or phone within 4 business days after the clinic has received the results. If you do not hear from us within 7 days, please contact the clinic through MyChart or phone. If you have a critical or abnormal lab result, we will notify you by phone as soon as possible.  Submit refill requests through [employer] or call your pharmacy and they will forward the refill request to us. Please allow 3 business days for your refill to be completed.          Additional Information About Your Visit        [employer] Information     [employer] gives you secure  access to your electronic health record. If you see a primary care provider, you can also send messages to your care team and make appointments. If you have questions, please call your primary care clinic.  If you do not have a primary care provider, please call [phone] and they will assist you.        Care EveryWhere ID     This is your Care EveryWhere ID. This could be used by other organizations to access your [place] medical records  [id number]        Your Vitals Were     Pulse Temperature Pulse Oximetry BMI (Body Mass Index)          80 98  F (36.7  C) (Oral) 100% 30.4 kg/m2         Blood Pressure from Last 3 Encounters:   05/01/17 122/74   03/13/17 133/84   03/10/17 138/88    Weight from Last 3 Encounters:   05/01/17 82.2 kg (181 lb 4.8 oz)   03/13/17 81.6 kg (180 lb)   03/10/17 80.2 kg (176 lb 12.8 oz)              We Performed the Following     Ferritin     Hemoglobin A1c     TSH with free T4 reflex          Today's Medication Changes          These changes are accurate as of: 5/1/17 12:53 PM.  If you have any questions, ask your nurse or doctor.               These medicines have changed or have updated prescriptions.        Dose/Directions    magnesium oxide 400 (241.3 MG) MG tablet   Commonly known as:  MAG-OX   This may have changed:  when to take this   Used for:  Low magnesium levels        Dose:  400 mg   Take 1 tablet (400 mg) by mouth 2 times daily   Quantity:  60 tablet   Refills:  11            Where to get your medicines      These medications were sent to [facility] [address] AT [address]  [address] [facility] [address]     Phone:  [phone]     losartan 100 MG tablet                Primary Care Provider Office Phone #    [facility] [phone]       No address on file        Thank you!     Thank you for choosing [facility]  for your care. Our goal is always to provide you  with excellent care. Hearing back from our patients is one way we can continue to improve our services. Please take a few minutes to complete the written survey that you may receive in the mail after your visit with us. Thank you!             Your Updated Medication List - Protect others around you: Learn how to safely use, store and throw away your medicines at www.disposemymeds.org.          This list is accurate as of: 5/1/17 12:53 PM.  Always use your most recent med list.                   Brand Name Dispense Instructions for use    calcium-vitamin D 600-400 MG-UNIT per tablet    CALTRATE     Take 1 tablet by mouth 2 times daily       losartan 100 MG tablet    COZAAR    30 tablet    Take 1 tablet (100 mg) by mouth daily       magnesium oxide 400 (241.3 MG) MG tablet    MAG-OX    60 tablet    Take 1 tablet (400 mg) by mouth 2 times daily       Multi-vitamin Tabs tablet      Take 1 tablet by mouth daily prenatal       OSTEO BI-FLEX ADV DOUBLE ST PO      Take by mouth daily       priLOSEC 20 MG CR capsule   Generic drug:  omeprazole      Take 20 mg by mouth daily       tacrolimus 1 MG capsule    PROGRAF - GENERIC EQUIVALENT    240 capsule    Take 4 capsules (4 mg) by mouth every 12 hours

## 2017-05-01 NOTE — NURSING NOTE
"Chief Complaint   Patient presents with     Hypertension       Initial /76 (BP Location: Right arm, Patient Position: Chair, Cuff Size: Adult Large)  Pulse 80  Temp 98  F (36.7  C) (Oral)  Wt 82.2 kg (181 lb 4.8 oz)  SpO2 100%  BMI 30.4 kg/m2 Estimated body mass index is 30.4 kg/(m^2) as calculated from the following:    Height as of 3/13/17: 1.645 m (5' 4.75\").    Weight as of this encounter: 82.2 kg (181 lb 4.8 oz).  Medication Reconciliation: complete       David Goodson CMA      "

## 2017-05-03 NOTE — PROGRESS NOTES
Your iron store (as evidenced by ferritin level) are normal.  Your thyroid testing is normal.  Your blood sugar over the last 3 months has been normal.  Please call or MyChart message me if you have any questions.    There is nothing on these labs to suggest another cause for your fatigue symptoms.    RUDY

## 2017-05-09 ENCOUNTER — TELEPHONE (OUTPATIENT)
Dept: PHARMACY | Facility: CLINIC | Age: 67
End: 2017-05-09

## 2017-05-09 DIAGNOSIS — Z94.4 LIVER REPLACED BY TRANSPLANT (H): ICD-10-CM

## 2017-05-09 DIAGNOSIS — Z92.25 HISTORY OF IMMUNOSUPPRESSION THERAPY: ICD-10-CM

## 2017-05-10 RX ORDER — TACROLIMUS 1 MG/1
3 CAPSULE ORAL EVERY 12 HOURS
Qty: 180 CAPSULE | Refills: 11 | Status: SHIPPED | OUTPATIENT
Start: 2017-05-10 | End: 2017-11-07

## 2017-05-25 ENCOUNTER — TELEPHONE (OUTPATIENT)
Dept: GASTROENTEROLOGY | Facility: CLINIC | Age: 67
End: 2017-05-25

## 2017-06-01 DIAGNOSIS — C22.0 HCC (HEPATOCELLULAR CARCINOMA) (H): ICD-10-CM

## 2017-06-01 DIAGNOSIS — Z94.4 LIVER REPLACED BY TRANSPLANT (H): ICD-10-CM

## 2017-06-01 DIAGNOSIS — Z79.60 LONG-TERM USE OF IMMUNOSUPPRESSANT MEDICATION: ICD-10-CM

## 2017-06-01 LAB
AFP SERPL-MCNC: NORMAL UG/L (ref 0–8)
ALBUMIN SERPL-MCNC: 3.5 G/DL (ref 3.4–5)
ALP SERPL-CCNC: 128 U/L (ref 40–150)
ALT SERPL W P-5'-P-CCNC: 20 U/L (ref 0–50)
ANION GAP SERPL CALCULATED.3IONS-SCNC: 7 MMOL/L (ref 3–14)
AST SERPL W P-5'-P-CCNC: 11 U/L (ref 0–45)
BASOPHILS # BLD AUTO: 0 10E9/L (ref 0–0.2)
BASOPHILS NFR BLD AUTO: 0.3 %
BILIRUB DIRECT SERPL-MCNC: 0.1 MG/DL (ref 0–0.2)
BILIRUB SERPL-MCNC: 0.5 MG/DL (ref 0.2–1.3)
BUN SERPL-MCNC: 19 MG/DL (ref 7–30)
CALCIUM SERPL-MCNC: 9 MG/DL (ref 8.5–10.1)
CHLORIDE SERPL-SCNC: 105 MMOL/L (ref 94–109)
CO2 SERPL-SCNC: 28 MMOL/L (ref 20–32)
CREAT SERPL-MCNC: 0.91 MG/DL (ref 0.52–1.04)
DIFFERENTIAL METHOD BLD: ABNORMAL
EOSINOPHIL # BLD AUTO: 0.1 10E9/L (ref 0–0.7)
EOSINOPHIL NFR BLD AUTO: 1.8 %
ERYTHROCYTE [DISTWIDTH] IN BLOOD BY AUTOMATED COUNT: 13.5 % (ref 10–15)
GFR SERPL CREATININE-BSD FRML MDRD: 62 ML/MIN/1.7M2
GLUCOSE SERPL-MCNC: 134 MG/DL (ref 70–99)
HCT VFR BLD AUTO: 36.4 % (ref 35–47)
HGB BLD-MCNC: 12.8 G/DL (ref 11.7–15.7)
LYMPHOCYTES # BLD AUTO: 0.7 10E9/L (ref 0.8–5.3)
LYMPHOCYTES NFR BLD AUTO: 22.1 %
MAGNESIUM SERPL-MCNC: 1.9 MG/DL (ref 1.6–2.3)
MCH RBC QN AUTO: 30.1 PG (ref 26.5–33)
MCHC RBC AUTO-ENTMCNC: 35.2 G/DL (ref 31.5–36.5)
MCV RBC AUTO: 86 FL (ref 78–100)
MONOCYTES # BLD AUTO: 0.4 10E9/L (ref 0–1.3)
MONOCYTES NFR BLD AUTO: 11.6 %
NEUTROPHILS # BLD AUTO: 2.2 10E9/L (ref 1.6–8.3)
NEUTROPHILS NFR BLD AUTO: 64.2 %
PHOSPHATE SERPL-MCNC: 4.4 MG/DL (ref 2.5–4.5)
PLATELET # BLD AUTO: 181 10E9/L (ref 150–450)
POTASSIUM SERPL-SCNC: 4.9 MMOL/L (ref 3.4–5.3)
PROT SERPL-MCNC: 7.5 G/DL (ref 6.8–8.8)
RBC # BLD AUTO: 4.25 10E12/L (ref 3.8–5.2)
SODIUM SERPL-SCNC: 140 MMOL/L (ref 133–144)
TACROLIMUS BLD-MCNC: 6.2 UG/L (ref 5–15)
TME LAST DOSE: NORMAL H
WBC # BLD AUTO: 3.4 10E9/L (ref 4–11)

## 2017-06-01 PROCEDURE — 36415 COLL VENOUS BLD VENIPUNCTURE: CPT | Performed by: INTERNAL MEDICINE

## 2017-06-01 PROCEDURE — 82248 BILIRUBIN DIRECT: CPT | Performed by: INTERNAL MEDICINE

## 2017-06-01 PROCEDURE — 82105 ALPHA-FETOPROTEIN SERUM: CPT | Performed by: INTERNAL MEDICINE

## 2017-06-01 PROCEDURE — 85025 COMPLETE CBC W/AUTO DIFF WBC: CPT | Performed by: INTERNAL MEDICINE

## 2017-06-01 PROCEDURE — 83735 ASSAY OF MAGNESIUM: CPT | Performed by: INTERNAL MEDICINE

## 2017-06-01 PROCEDURE — 84100 ASSAY OF PHOSPHORUS: CPT | Performed by: INTERNAL MEDICINE

## 2017-06-01 PROCEDURE — 80053 COMPREHEN METABOLIC PANEL: CPT | Performed by: INTERNAL MEDICINE

## 2017-06-01 PROCEDURE — 80197 ASSAY OF TACROLIMUS: CPT | Performed by: INTERNAL MEDICINE

## 2017-06-09 ENCOUNTER — OFFICE VISIT (OUTPATIENT)
Dept: GASTROENTEROLOGY | Facility: CLINIC | Age: 67
End: 2017-06-09
Attending: INTERNAL MEDICINE
Payer: MEDICARE

## 2017-06-09 VITALS
HEART RATE: 83 BPM | SYSTOLIC BLOOD PRESSURE: 136 MMHG | HEIGHT: 65 IN | DIASTOLIC BLOOD PRESSURE: 89 MMHG | BODY MASS INDEX: 29.99 KG/M2 | OXYGEN SATURATION: 96 % | TEMPERATURE: 98.1 F | WEIGHT: 180 LBS

## 2017-06-09 DIAGNOSIS — Z94.4 HISTORY OF LIVER TRANSPLANT (H): Primary | ICD-10-CM

## 2017-06-09 DIAGNOSIS — Z94.4 LIVER REPLACED BY TRANSPLANT (H): Primary | ICD-10-CM

## 2017-06-09 DIAGNOSIS — Z79.60 LONG-TERM USE OF IMMUNOSUPPRESSANT MEDICATION: ICD-10-CM

## 2017-06-09 PROCEDURE — 99212 OFFICE O/P EST SF 10 MIN: CPT | Mod: ZF

## 2017-06-09 ASSESSMENT — PAIN SCALES - GENERAL: PAINLEVEL: NO PAIN (0)

## 2017-06-09 NOTE — LETTER
6/9/2017      RE: Joselyn Leigh  [address] [address]       I had the pleasure of seeing Joselyn Leigh for followup in the Liver Transplantation Clinic at the [facility] on 06/09/2017.  Ms. 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      For the most part, she is doing well.  She is not particularly happy about her abdominal appearance, as there is some distention and relatively poor abdominal tone.  However, she denies any abdominal pain, itching or skin rash and has only mild fatigue.  She denies any fevers or chills, cough or shortness of breath.  She denies any nausea, vomiting, diarrhea or constipation.  Her appetite has been good.  She is not exercising much because of complaints of back and some joint pains.       Current Outpatient Prescriptions   Medication     tacrolimus (PROGRAF - GENERIC EQUIVALENT) 1 MG capsule     losartan (COZAAR) 100 MG tablet     Misc Natural Products (OSTEO BI-FLEX ADV DOUBLE ST PO)     magnesium oxide (MAG-OX) 400 (241.3 MG) MG tablet     calcium-vitamin D (CALTRATE) 600-400 MG-UNIT per tablet     omeprazole (PRILOSEC) 20 MG capsule     multivitamin, therapeutic with minerals (MULTI-VITAMIN) TABS     No current facility-administered medications for this visit.      B/P: 136/89, T: 98.1, P: 83, R: Data Unavailable    HEENT exam shows no scleral icterus and no temporal muscle wasting.  Her chest is clear.  Her abdominal exam shows no increase in girth.  No masses or tenderness to palpation are present.  Her liver is 10 cm in span without left lobe enlargement.  Her incision is intact.  No spleen tip is palpable, and extremity exam shows no edema.  Skin exam shows no suspicious lesions.  Neurologic exam is nonfocal.      Her most recent laboratory tests show her white count is 3.4, hemoglobin is 12.8, platelets are 181,000.   Her sodium is 140, potassium 4.9, BUN is 19, creatinine 0.9.  AST is 11, ALT is 20, alkaline phosphatase is 128.  Albumin is 3.5 with total protein of 7.5, total bilirubin is 0.5.  Her alpha-fetoprotein is less than 1.5.      My impression is that Ms. Leigh is doing very well now more than 1 year status post liver transplantation.  I have encouraged her to seek out some water aerobics which should be much easier on her back and joints and a good way to get exercise.  She does need to decrease her calories in order to prevent future weight gain as well.  I, otherwise, will not be making any change to her medical regimen.  They are contemplating a move to [facility], which may entail finding her a new Hepatology group as well; however, that is not going to happen in the immediate future.      Thank you very much for allowing me to participate in the care of this patient.  If you have any questions regarding my recommendations, please do not hesitate to contact me.       Chuck Mata MD      Professor of Medicine  [employer]      Executive Medical Director, Solid Organ Transplant Program  [facility]

## 2017-06-09 NOTE — PROGRESS NOTES
Annual chart review completed.    Pt is doing labs at regular advised interval       Pt has been seen at [facility] within the past year:  Scheduled for return 6/09, now post tx 1 year.

## 2017-06-09 NOTE — MR AVS SNAPSHOT
After Visit Summary   6/9/2017    Joselyn Leigh    MRN: [id number]           Patient Information     Date Of Birth          1950        Visit Information        Provider Department      6/9/2017 9:00 AM Chuck Mata MD [facility] Hepat[facility]        Today's Diagnoses     Liver replaced by transplant (H)    -  1       Follow-ups after your visit        Follow-up notes from your care team     Return in about 6 months (around 12/9/2017).      Your next 10 appointments already scheduled     Dec 18, 2017  1:15 PM CST   (Arrive by 1:00 PM)   Return Liver Transplant with Chuck Mata MD   [facility] ([facility])    [address]  [address] [facility] [address] [phone]              Who to contact     If you have questions or need follow up information about today's clinic visit or your schedule please contact [facility]PAT[facility] directly at [phone].  Normal or non-critical lab and imaging results will be communicated to you by MyChart, letter or phone within 4 business days after the clinic has received the results. If you do not hear from us within 7 days, please contact the clinic through [employer] or phone. If you have a critical or abnormal lab result, we will notify you by phone as soon as possible.  Submit refill requests through [employer] or call your pharmacy and they will forward the refill request to us. Please allow 3 business days for your refill to be completed.          Additional Information About Your Visit        MyChart Information     [employer] gives you secure access to your electronic health record. If you see a primary care provider, you can also send messages to your care team and make appointments. If you have questions, please call your primary care clinic.  If you do not have a primary care provider, please call [phone] and they will assist you.        Care EveryWhere ID     This is your Care EveryWhere ID. This could be  "used by other organizations to access your [place] medical records  [id number]        Your Vitals Were     Pulse Temperature Height Pulse Oximetry BMI (Body Mass Index)       83 98.1  F (36.7  C) (Oral) 1.645 m (5' 4.75\") 96% 30.19 kg/m2        Blood Pressure from Last 3 Encounters:   06/13/17 129/85   06/09/17 136/89   05/01/17 122/74    Weight from Last 3 Encounters:   06/13/17 82.1 kg (181 lb)   06/09/17 81.6 kg (180 lb)   05/01/17 82.2 kg (181 lb 4.8 oz)              Today, you had the following     No orders found for display         Today's Medication Changes          These changes are accurate as of: 6/9/17 11:59 PM.  If you have any questions, ask your nurse or doctor.               These medicines have changed or have updated prescriptions.        Dose/Directions    magnesium oxide 400 (241.3 MG) MG tablet   Commonly known as:  MAG-OX   This may have changed:  when to take this   Used for:  Low magnesium levels        Dose:  400 mg   Take 1 tablet (400 mg) by mouth 2 times daily   Quantity:  60 tablet   Refills:  11                Primary Care Provider Office Phone #    [facility] [phone]       No address on file        Thank you!     Thank you for choosing [facility]PAT[facility]  for your care. Our goal is always to provide you with excellent care. Hearing back from our patients is one way we can continue to improve our services. Please take a few minutes to complete the written survey that you may receive in the mail after your visit with us. Thank you!             Your Updated Medication List - Protect others around you: Learn how to safely use, store and throw away your medicines at www.disposemymeds.org.          This list is accurate as of: 6/9/17 11:59 PM.  Always use your most recent med list.                   Brand Name Dispense Instructions for use    calcium-vitamin D 600-400 MG-UNIT per tablet    CALTRATE     Take 1 tablet by mouth 2 times daily       losartan 100 MG tablet "    COZAAR    30 tablet    Take 1 tablet (100 mg) by mouth daily       magnesium oxide 400 (241.3 MG) MG tablet    MAG-OX    60 tablet    Take 1 tablet (400 mg) by mouth 2 times daily       Multi-vitamin Tabs tablet      Take 1 tablet by mouth daily prenatal       OSTEO BI-FLEX ADV DOUBLE ST PO      Take by mouth daily       priLOSEC 20 MG CR capsule   Generic drug:  omeprazole      Take 20 mg by mouth daily       tacrolimus 1 MG capsule    PROGRAF - GENERIC EQUIVALENT    180 capsule    Take 3 capsules (3 mg) by mouth every 12 hours

## 2017-06-09 NOTE — NURSING NOTE
"Chief Complaint   Patient presents with     RECHECK     Follow up cirrosis of liver without ascites.       Initial /89  Pulse 83  Temp 98.1  F (36.7  C) (Oral)  Ht 1.645 m (5' 4.75\")  Wt 81.6 kg (180 lb)  SpO2 96%  BMI 30.19 kg/m2 Estimated body mass index is 30.19 kg/(m^2) as calculated from the following:    Height as of this encounter: 1.645 m (5' 4.75\").    Weight as of this encounter: 81.6 kg (180 lb).  Medication Reconciliation: complete   Ena Mendoza. CMA    "

## 2017-06-13 ENCOUNTER — ONCOLOGY VISIT (OUTPATIENT)
Dept: ONCOLOGY | Facility: CLINIC | Age: 67
End: 2017-06-13
Attending: NURSE PRACTITIONER
Payer: COMMERCIAL

## 2017-06-13 VITALS
HEART RATE: 78 BPM | SYSTOLIC BLOOD PRESSURE: 129 MMHG | RESPIRATION RATE: 18 BRPM | BODY MASS INDEX: 30.35 KG/M2 | WEIGHT: 181 LBS | DIASTOLIC BLOOD PRESSURE: 85 MMHG | OXYGEN SATURATION: 97 % | TEMPERATURE: 97.9 F

## 2017-06-13 DIAGNOSIS — Z94.4 LIVER REPLACED BY TRANSPLANT (H): ICD-10-CM

## 2017-06-13 DIAGNOSIS — C22.0 HCC (HEPATOCELLULAR CARCINOMA) (H): Primary | ICD-10-CM

## 2017-06-13 PROCEDURE — 99214 OFFICE O/P EST MOD 30 MIN: CPT | Mod: ZP | Performed by: NURSE PRACTITIONER

## 2017-06-13 PROCEDURE — 99212 OFFICE O/P EST SF 10 MIN: CPT | Mod: ZF

## 2017-06-13 ASSESSMENT — PAIN SCALES - GENERAL: PAINLEVEL: NO PAIN (0)

## 2017-06-13 NOTE — NURSING NOTE
"Oncology Rooming Note    June 13, 2017 10:51 AM   Joselyn Leigh is a 67 year old female who presents for:    Chief Complaint   Patient presents with     Oncology Clinic Visit     Return for HCC     Initial Vitals: /85 (BP Location: Left arm, Patient Position: Chair, Cuff Size: Adult Regular)  Pulse 78  Temp 97.9  F (36.6  C) (Oral)  Resp 18  Wt 82.1 kg (181 lb)  SpO2 97%  BMI 30.35 kg/m2 Estimated body mass index is 30.35 kg/(m^2) as calculated from the following:    Height as of 6/9/17: 1.645 m (5' 4.75\").    Weight as of this encounter: 82.1 kg (181 lb). Body surface area is 1.94 meters squared.  No Pain (0) Comment: Data Unavailable   No LMP recorded. Patient is not currently having periods (Reason: Premenopausal).  Allergies reviewed: Yes  Medications reviewed: Yes    Medications: Medication refills not needed today.  Pharmacy name entered into [facility]:    [facility] [address] [facility] [address]  [place] [facility] - [place], [place] - [address]    Clinical concerns: no concerns  Corey was notified.    7  minutes for nursing intake (face to face time)     Quita Hernandez MA              "

## 2017-06-13 NOTE — MR AVS SNAPSHOT
After Visit Summary   6/13/2017    Joselyn Leigh    MRN: [id number]           Patient Information     Date Of Birth          1950        Visit Information        Provider Department      6/13/2017 10:30 AM Ariana Nicole APRN CNP [facility]        Today's Diagnoses     HCC (hepatocellular carcinoma) (H)    -  1    Liver replaced by transplant (H)           Follow-ups after your visit        Your next 10 appointments already scheduled     Dec 18, 2017  1:15 PM CST   (Arrive by 1:00 PM)   Return Liver Transplant with Chuck Mata MD   [facility] Hepat[facility] ([facility])    [address] [place] [address] [phone]              Future tests that were ordered for you today     Open Future Orders        Priority Expected Expires Ordered    CT Chest/Abdomen/Pelvis w Contrast Routine 9/13/2017 12/13/2017 6/13/2017    CBC with platelets differential Routine 9/13/2017 12/13/2017 6/13/2017    Comprehensive metabolic panel Routine 9/13/2017 12/13/2017 6/13/2017    AFP tumor marker Routine 9/13/2017 12/13/2017 6/13/2017            Who to contact     If you have questions or need follow up information about today's clinic visit or your schedule please contact [facility] directly at [phone].  Normal or non-critical lab and imaging results will be communicated to you by MyChart, letter or phone within 4 business days after the clinic has received the results. If you do not hear from us within 7 days, please contact the clinic through MyChart or phone. If you have a critical or abnormal lab result, we will notify you by phone as soon as possible.  Submit refill requests through [employer] or call your pharmacy and they will forward the refill request to us. Please allow 3 business days for your refill to be completed.          Additional Information About Your Visit        [employer] Information     [employer] gives you  secure access to your electronic health record. If you see a primary care provider, you can also send messages to your care team and make appointments. If you have questions, please call your primary care clinic.  If you do not have a primary care provider, please call [phone] and they will assist you.        Care EveryWhere ID     This is your Care EveryWhere ID. This could be used by other organizations to access your [place] medical records  [id number]        Your Vitals Were     Pulse Temperature Respirations Pulse Oximetry BMI (Body Mass Index)       78 97.9  F (36.6  C) (Oral) 18 97% 30.35 kg/m2        Blood Pressure from Last 3 Encounters:   06/13/17 129/85   06/09/17 136/89   05/01/17 122/74    Weight from Last 3 Encounters:   06/13/17 82.1 kg (181 lb)   06/09/17 81.6 kg (180 lb)   05/01/17 82.2 kg (181 lb 4.8 oz)                 Today's Medication Changes          These changes are accurate as of: 6/13/17 11:46 AM.  If you have any questions, ask your nurse or doctor.               These medicines have changed or have updated prescriptions.        Dose/Directions    magnesium oxide 400 (241.3 MG) MG tablet   Commonly known as:  MAG-OX   This may have changed:  when to take this   Used for:  Low magnesium levels        Dose:  400 mg   Take 1 tablet (400 mg) by mouth 2 times daily   Quantity:  60 tablet   Refills:  11                Primary Care Provider Office Phone #    [facility] [phone]       No address on file        Thank you!     Thank you for choosing [facility]  for your care. Our goal is always to provide you with excellent care. Hearing back from our patients is one way we can continue to improve our services. Please take a few minutes to complete the written survey that you may receive in the mail after your visit with us. Thank you!             Your Updated Medication List - Protect others around you: Learn how to safely use, store and throw away  your medicines at www.disposemymeds.org.          This list is accurate as of: 6/13/17 11:46 AM.  Always use your most recent med list.                   Brand Name Dispense Instructions for use    calcium-vitamin D 600-400 MG-UNIT per tablet    CALTRATE     Take 1 tablet by mouth 2 times daily       losartan 100 MG tablet    COZAAR    30 tablet    Take 1 tablet (100 mg) by mouth daily       magnesium oxide 400 (241.3 MG) MG tablet    MAG-OX    60 tablet    Take 1 tablet (400 mg) by mouth 2 times daily       Multi-vitamin Tabs tablet      Take 1 tablet by mouth daily prenatal       OSTEO BI-FLEX ADV DOUBLE ST PO      Take by mouth daily       priLOSEC 20 MG CR capsule   Generic drug:  omeprazole      Take 20 mg by mouth daily       tacrolimus 1 MG capsule    PROGRAF - GENERIC EQUIVALENT    180 capsule    Take 3 capsules (3 mg) by mouth every 12 hours

## 2017-06-13 NOTE — Clinical Note
6/13/2017       RE: Joselyn Leigh  [address] [facility] / [facility] [address]     Dear Colleague,    Thank you for referring your patient, Joselyn Leigh, to the [facility]. Please see a copy of my visit note below.    Joselyn Leigh is a 67 year old woman seen in follow-up of HCC in the setting of chronic Hep C related cirrhosis. She had a liver transplantation in May 2016.  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  At the time of explant, that tumor was mostly necrotic.  She had a second, a little over 1-cm hepatoma and then a third nodule which was just a dysplastic nodule. She is seen for routine surveillance today.    Interval history: Joselyn is here with her  today. She has been feeling OK. Has some chronic complaints of back pain and breakthrough reflux that have not significantly changed. Is taking omeprazole daily. Finds that with higher fat foods she has breakthrough reflux and takes occasional ranitidine. No melena. Bowels are regular. No N/V. No edema. No fevers/chills. No headaches/vision changes. No mouth sores. No bone aches.     She and her  are in the process of listing/selling their house. They plan on moving to [place] sometime this fall.    Current Outpatient Prescriptions   Medication Sig Dispense Refill     tacrolimus (PROGRAF - GENERIC EQUIVALENT) 1 MG capsule Take 3 capsules (3 mg) by mouth every 12 hours 180 capsule 11     losartan (COZAAR) 100 MG tablet Take 1 tablet (100 mg) by mouth daily 30 tablet 5     Misc Natural Products (OSTEO BI-FLEX ADV DOUBLE ST PO) Take by mouth daily       magnesium oxide (MAG-OX) 400 (241.3 MG) MG tablet Take 1 tablet (400 mg) by mouth 2 times daily (Patient taking differently: Take 400 mg by mouth daily ) 60 tablet 11     calcium-vitamin D (CALTRATE) 600-400 MG-UNIT per tablet Take 1 tablet by mouth 2 times daily       omeprazole (PRILOSEC) 20 MG capsule  Take 20 mg by mouth daily       multivitamin, therapeutic with minerals (MULTI-VITAMIN) TABS Take 1 tablet by mouth daily prenatal       Exam: Alert, appears in NAD. Oropharynx is moist, no focal lesion. No icterus. Neck supple and without adenopathy. Lungs:CTA. Heart:RRR, no murmur or rub. Abdomen: soft, nontender, BS active. No masses or organomegaly.Extremities: warm, no edema. Speech clear. CN wnl. Gait/station wnl.      Labs:  Results for HU MARINO (MRN [id number]) as of 6/13/2017 10:07   Ref. Range 6/1/2017 09:15   Sodium Latest Ref Range: 133 - 144 mmol/L 140   Potassium Latest Ref Range: 3.4 - 5.3 mmol/L 4.9   Chloride Latest Ref Range: 94 - 109 mmol/L 105   Carbon Dioxide Latest Ref Range: 20 - 32 mmol/L 28   Urea Nitrogen Latest Ref Range: 7 - 30 mg/dL 19   Creatinine Latest Ref Range: 0.52 - 1.04 mg/dL 0.91   GFR Estimate Latest Ref Range: >60 mL/min/1.7m2 62   GFR Estimate If Black Latest Ref Range: >60 mL/min/1.7m2 74   Calcium Latest Ref Range: 8.5 - 10.1 mg/dL 9.0   Anion Gap Latest Ref Range: 3 - 14 mmol/L 7   Magnesium Latest Ref Range: 1.6 - 2.3 mg/dL 1.9   Phosphorus Latest Ref Range: 2.5 - 4.5 mg/dL 4.4   Albumin Latest Ref Range: 3.4 - 5.0 g/dL 3.5   Protein Total Latest Ref Range: 6.8 - 8.8 g/dL 7.5   Bilirubin Total Latest Ref Range: 0.2 - 1.3 mg/dL 0.5   Alkaline Phosphatase Latest Ref Range: 40 - 150 U/L 128   ALT Latest Ref Range: 0 - 50 U/L 20   AST Latest Ref Range: 0 - 45 U/L 11   Alpha Fetoprotein Latest Ref Range: 0 - 8 ug/L <1.5...   Bilirubin Direct Latest Ref Range: 0.0 - 0.2 mg/dL 0.1   Glucose Latest Ref Range: 70 - 99 mg/dL 134 (H)   WBC Latest Ref Range: 4.0 - 11.0 10e9/L 3.4 (L)   Hemoglobin Latest Ref Range: 11.7 - 15.7 g/dL 12.8   Hematocrit Latest Ref Range: 35.0 - 47.0 % 36.4   Platelet Count Latest Ref Range: 150 - 450 10e9/L 181   RBC Count Latest Ref Range: 3.8 - 5.2 10e12/L 4.25   MCV Latest Ref Range: 78 - 100 fl 86   MCH Latest Ref Range: 26.5 - 33.0 pg 30.1    MCHC Latest Ref Range: 31.5 - 36.5 g/dL 35.2   RDW Latest Ref Range: 10.0 - 15.0 % 13.5   Diff Method Unknown Automated Method   % Neutrophils Latest Units: % 64.2   % Lymphocytes Latest Units: % 22.1   % Monocytes Latest Units: % 11.6   % Eosinophils Latest Units: % 1.8   % Basophils Latest Units: % 0.3   Absolute Neutrophil Latest Ref Range: 1.6 - 8.3 10e9/L 2.2   Absolute Lymphocytes Latest Ref Range: 0.8 - 5.3 10e9/L 0.7 (L)   Absolute Monocytes Latest Ref Range: 0.0 - 1.3 10e9/L 0.4   Absolute Eosinophils Latest Ref Range: 0.0 - 0.7 10e9/L 0.1   Absolute Basophils Latest Ref Range: 0.0 - 0.2 10e9/L 0.0     Imaging:  EXAMINATION: CT CHEST/ABDOMEN/PELVIS W CONTRAST, 6/9/2017 10:27 AM     TECHNIQUE:  Helical CT images from the thoracic inlet through the  symphysis pubis were obtained  with contrast at different time points.  Contrast dose: Isovue-370  111cc     COMPARISON: CT dated 3/10/2017     HISTORY: Liver cell carcinoma     FINDINGS:     Chest: No mediastinal, hilar or axillary lymphadenopathy. Heart is not  enlarged. Great vessels are normal in caliber. Central  tracheobronchial tree is patent. There is no pleural or pericardial  effusion. No pneumothorax.  Unchanged slight heterogeneous appearance of the thyroid gland.  A few scattered pulmonary nodules are stable. For example: Stable 2 mm  nodule at the left lung apex (image 18, series 10). Stable 2 mm nodule  in the lateral aspect of the right upper lobe (image 35, series 10).  Stable 5 mm nodule in the lateral aspect of the left lower lobe (image  101, series 10). Stable 4 mm nodule within the lateral aspect of the  left lower lobe (image 88, series 10). Stable 2 mm nodule in the  inferior aspect of the right upper lobe (image 55, series 10).  No focal pulmonary opacity.     Abdomen and pelvis: Postsurgical changes orthotopic liver  transplantation. No focal liver lesions are identified. No intra or  extrahepatic biliary ductal dilation. Common bile  duct measures 6 mm  in caliber. Gallbladder is surgically absent.  Unchanged mild to moderate splenomegaly. A splenule is noted along the  medial aspect of the spleen. The pancreas, both adrenal glands have a  normal appearance.   Multiple small hypodensities in the kidneys bilaterally, are again  identified. A small hypodensity in the upper pole of the left kidney  posteriorly has slightly decreased in size and a small hypodensity in  the posterior aspect of the interpolar region of the left kidney has  increased in size, now measuring 0.7 cm, previously measured 0.4 cm.  The hypodense lesions in the right kidney have not significantly  changed.  Urinary bladder is partially distended.  There is hyperdensity, likely ingested contents such as an iron pill,  near the pylorus.  Mild wall thickening of the gastric antrum and  proximal duodenum. No bowel obstruction. Unchanged fibroid in the body  of the uterus. No adnexal masses.  Stable small lymph nodes at the omero hepatis and in the  retroperitoneum, likely reactive. Major abdominal vasculature is  patent. No abdominal aortic aneurysm. No free fluid.     Bones and soft tissues: Multilevel degenerative changes are noted in  the spine. No suspicious osseous lesions. Bone island is noted in the  body of L3 and in the left femoral head and left pelvic bones. Soft  tissues are unremarkable.         IMPRESSION: In this patient with history of hepatocellular carcinoma,   1. Postsurgical changes of orthotopic liver transplantation. No focal  liver lesions. No metastatic disease to the chest, abdomen or pelvis.  2. Stable few scattered sub-5 mm bilateral pulmonary nodules.  Recommend attention on follow-up imaging.  3. Multiple small hypodensities in the kidneys bilaterally, most of  them stable since prior study and likely representing cysts. A  hypodensity in the interpolar region of the left kidney has slightly  increased in size. Recommend attention on follow-up  imaging.  4. Mild wall thickening of the gastric antrum and proximal duodenum  may be inflammatory in nature.     I have personally reviewed the examination and initial interpretation  and I agree with the findings.     YARON PEACE MD    Impression/plan:    1. HCC, s/p liver transplantation in May 2016. No evidence of recurrence on imaging or exam today. Pulmonary nodules will continue to be followed on surveillance imaging. Reviewed the plan for surveillance, which will be to continue every 3 month CT CAP for another year, then extend to every 6 months for an additional 3 years for a total of 5 years of surveillance. If she moves, this could be done through a local oncologist. I encouraged them to think about where their medical home will be when they make the move as she will continue to require lab monitoring through transplant.    2. Gastric antrum, duodenal thickening. Suspect gastritis/duodenitis given breakthrough symptoms.  -Will review with Dr. Mata and review whether he would like to pursue endoscopy.    Again, thank you for allowing me to participate in the care of your patient.      Sincerely,    BETO Swift CNP

## 2017-06-13 NOTE — PROGRESS NOTES
Joselyn Leigh is a 67 year old woman seen in follow-up of HCC in the setting of chronic Hep C related cirrhosis. She had a liver transplantation in May 2016.  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  At the time of explant, that tumor was mostly necrotic.  She had a second, a little over 1-cm hepatoma and then a third nodule which was just a dysplastic nodule. She is seen for routine surveillance today.    Interval history: Joselyn is here with her  today. She has been feeling OK. Has some chronic complaints of back pain and breakthrough reflux that have not significantly changed. Is taking omeprazole daily. Finds that with higher fat foods she has breakthrough reflux and takes occasional ranitidine. No melena. Bowels are regular. No N/V. No edema. No fevers/chills. No headaches/vision changes. No mouth sores. No bone aches.     She and her  are in the process of listing/selling their house. They plan on moving to [place] sometime this fall.    Current Outpatient Prescriptions   Medication Sig Dispense Refill     tacrolimus (PROGRAF - GENERIC EQUIVALENT) 1 MG capsule Take 3 capsules (3 mg) by mouth every 12 hours 180 capsule 11     losartan (COZAAR) 100 MG tablet Take 1 tablet (100 mg) by mouth daily 30 tablet 5     Misc Natural Products (OSTEO BI-FLEX ADV DOUBLE ST PO) Take by mouth daily       magnesium oxide (MAG-OX) 400 (241.3 MG) MG tablet Take 1 tablet (400 mg) by mouth 2 times daily (Patient taking differently: Take 400 mg by mouth daily ) 60 tablet 11     calcium-vitamin D (CALTRATE) 600-400 MG-UNIT per tablet Take 1 tablet by mouth 2 times daily       omeprazole (PRILOSEC) 20 MG capsule Take 20 mg by mouth daily       multivitamin, therapeutic with minerals (MULTI-VITAMIN) TABS Take 1 tablet by mouth daily prenatal       Exam: Alert, appears in NAD. Oropharynx is moist, no focal lesion. No icterus. Neck supple and without  adenopathy. Lungs:CTA. Heart:RRR, no murmur or rub. Abdomen: soft, nontender, BS active. No masses or organomegaly.Extremities: warm, no edema. Speech clear. CN wnl. Gait/station wnl.      Labs:  Results for HU MARINO (MRN [id number]) as of 6/13/2017 10:07   Ref. Range 6/1/2017 09:15   Sodium Latest Ref Range: 133 - 144 mmol/L 140   Potassium Latest Ref Range: 3.4 - 5.3 mmol/L 4.9   Chloride Latest Ref Range: 94 - 109 mmol/L 105   Carbon Dioxide Latest Ref Range: 20 - 32 mmol/L 28   Urea Nitrogen Latest Ref Range: 7 - 30 mg/dL 19   Creatinine Latest Ref Range: 0.52 - 1.04 mg/dL 0.91   GFR Estimate Latest Ref Range: >60 mL/min/1.7m2 62   GFR Estimate If Black Latest Ref Range: >60 mL/min/1.7m2 74   Calcium Latest Ref Range: 8.5 - 10.1 mg/dL 9.0   Anion Gap Latest Ref Range: 3 - 14 mmol/L 7   Magnesium Latest Ref Range: 1.6 - 2.3 mg/dL 1.9   Phosphorus Latest Ref Range: 2.5 - 4.5 mg/dL 4.4   Albumin Latest Ref Range: 3.4 - 5.0 g/dL 3.5   Protein Total Latest Ref Range: 6.8 - 8.8 g/dL 7.5   Bilirubin Total Latest Ref Range: 0.2 - 1.3 mg/dL 0.5   Alkaline Phosphatase Latest Ref Range: 40 - 150 U/L 128   ALT Latest Ref Range: 0 - 50 U/L 20   AST Latest Ref Range: 0 - 45 U/L 11   Alpha Fetoprotein Latest Ref Range: 0 - 8 ug/L <1.5...   Bilirubin Direct Latest Ref Range: 0.0 - 0.2 mg/dL 0.1   Glucose Latest Ref Range: 70 - 99 mg/dL 134 (H)   WBC Latest Ref Range: 4.0 - 11.0 10e9/L 3.4 (L)   Hemoglobin Latest Ref Range: 11.7 - 15.7 g/dL 12.8   Hematocrit Latest Ref Range: 35.0 - 47.0 % 36.4   Platelet Count Latest Ref Range: 150 - 450 10e9/L 181   RBC Count Latest Ref Range: 3.8 - 5.2 10e12/L 4.25   MCV Latest Ref Range: 78 - 100 fl 86   MCH Latest Ref Range: 26.5 - 33.0 pg 30.1   MCHC Latest Ref Range: 31.5 - 36.5 g/dL 35.2   RDW Latest Ref Range: 10.0 - 15.0 % 13.5   Diff Method Unknown Automated Method   % Neutrophils Latest Units: % 64.2   % Lymphocytes Latest Units: % 22.1   % Monocytes Latest Units: % 11.6   %  Eosinophils Latest Units: % 1.8   % Basophils Latest Units: % 0.3   Absolute Neutrophil Latest Ref Range: 1.6 - 8.3 10e9/L 2.2   Absolute Lymphocytes Latest Ref Range: 0.8 - 5.3 10e9/L 0.7 (L)   Absolute Monocytes Latest Ref Range: 0.0 - 1.3 10e9/L 0.4   Absolute Eosinophils Latest Ref Range: 0.0 - 0.7 10e9/L 0.1   Absolute Basophils Latest Ref Range: 0.0 - 0.2 10e9/L 0.0     Imaging:  EXAMINATION: CT CHEST/ABDOMEN/PELVIS W CONTRAST, 6/9/2017 10:27 AM     TECHNIQUE:  Helical CT images from the thoracic inlet through the  symphysis pubis were obtained  with contrast at different time points.  Contrast dose: Isovue-370  111cc     COMPARISON: CT dated 3/10/2017     HISTORY: Liver cell carcinoma     FINDINGS:     Chest: No mediastinal, hilar or axillary lymphadenopathy. Heart is not  enlarged. Great vessels are normal in caliber. Central  tracheobronchial tree is patent. There is no pleural or pericardial  effusion. No pneumothorax.  Unchanged slight heterogeneous appearance of the thyroid gland.  A few scattered pulmonary nodules are stable. For example: Stable 2 mm  nodule at the left lung apex (image 18, series 10). Stable 2 mm nodule  in the lateral aspect of the right upper lobe (image 35, series 10).  Stable 5 mm nodule in the lateral aspect of the left lower lobe (image  101, series 10). Stable 4 mm nodule within the lateral aspect of the  left lower lobe (image 88, series 10). Stable 2 mm nodule in the  inferior aspect of the right upper lobe (image 55, series 10).  No focal pulmonary opacity.     Abdomen and pelvis: Postsurgical changes orthotopic liver  transplantation. No focal liver lesions are identified. No intra or  extrahepatic biliary ductal dilation. Common bile duct measures 6 mm  in caliber. Gallbladder is surgically absent.  Unchanged mild to moderate splenomegaly. A splenule is noted along the  medial aspect of the spleen. The pancreas, both adrenal glands have a  normal appearance.   Multiple  small hypodensities in the kidneys bilaterally, are again  identified. A small hypodensity in the upper pole of the left kidney  posteriorly has slightly decreased in size and a small hypodensity in  the posterior aspect of the interpolar region of the left kidney has  increased in size, now measuring 0.7 cm, previously measured 0.4 cm.  The hypodense lesions in the right kidney have not significantly  changed.  Urinary bladder is partially distended.  There is hyperdensity, likely ingested contents such as an iron pill,  near the pylorus.  Mild wall thickening of the gastric antrum and  proximal duodenum. No bowel obstruction. Unchanged fibroid in the body  of the uterus. No adnexal masses.  Stable small lymph nodes at the omero hepatis and in the  retroperitoneum, likely reactive. Major abdominal vasculature is  patent. No abdominal aortic aneurysm. No free fluid.     Bones and soft tissues: Multilevel degenerative changes are noted in  the spine. No suspicious osseous lesions. Bone island is noted in the  body of L3 and in the left femoral head and left pelvic bones. Soft  tissues are unremarkable.         IMPRESSION: In this patient with history of hepatocellular carcinoma,   1. Postsurgical changes of orthotopic liver transplantation. No focal  liver lesions. No metastatic disease to the chest, abdomen or pelvis.  2. Stable few scattered sub-5 mm bilateral pulmonary nodules.  Recommend attention on follow-up imaging.  3. Multiple small hypodensities in the kidneys bilaterally, most of  them stable since prior study and likely representing cysts. A  hypodensity in the interpolar region of the left kidney has slightly  increased in size. Recommend attention on follow-up imaging.  4. Mild wall thickening of the gastric antrum and proximal duodenum  may be inflammatory in nature.     I have personally reviewed the examination and initial interpretation  and I agree with the findings.     YARON PEACE,  MD    Impression/plan:    1. HCC, s/p liver transplantation in May 2016. No evidence of recurrence on imaging or exam today. Pulmonary nodules will continue to be followed on surveillance imaging. Reviewed the plan for surveillance, which will be to continue every 3 month CT CAP for another year, then extend to every 6 months for an additional 3 years for a total of 5 years of surveillance. If she moves, this could be done through a local oncologist. I encouraged them to think about where their medical home will be when they make the move as she will continue to require lab monitoring through transplant.    2. Gastric antrum, duodenal thickening. Suspect gastritis/duodenitis given breakthrough symptoms.  -Will review with Dr. Mata and review whether he would like to pursue endoscopy.

## 2017-06-17 NOTE — PROGRESS NOTES
I had the pleasure of seeing Joselyn Leigh for followup in the Liver Transplantation Clinic at the [facility] on 06/09/2017.  Ms. 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      For the most part, she is doing well.  She is not particularly happy about her abdominal appearance, as there is some distention and relatively poor abdominal tone.  However, she denies any abdominal pain, itching or skin rash and has only mild fatigue.  She denies any fevers or chills, cough or shortness of breath.  She denies any nausea, vomiting, diarrhea or constipation.  Her appetite has been good.  She is not exercising much because of complaints of back and some joint pains.       Current Outpatient Prescriptions   Medication     tacrolimus (PROGRAF - GENERIC EQUIVALENT) 1 MG capsule     losartan (COZAAR) 100 MG tablet     Misc Natural Products (OSTEO BI-FLEX ADV DOUBLE ST PO)     magnesium oxide (MAG-OX) 400 (241.3 MG) MG tablet     calcium-vitamin D (CALTRATE) 600-400 MG-UNIT per tablet     omeprazole (PRILOSEC) 20 MG capsule     multivitamin, therapeutic with minerals (MULTI-VITAMIN) TABS     No current facility-administered medications for this visit.      B/P: 136/89, T: 98.1, P: 83, R: Data Unavailable    HEENT exam shows no scleral icterus and no temporal muscle wasting.  Her chest is clear.  Her abdominal exam shows no increase in girth.  No masses or tenderness to palpation are present.  Her liver is 10 cm in span without left lobe enlargement.  Her incision is intact.  No spleen tip is palpable, and extremity exam shows no edema.  Skin exam shows no suspicious lesions.  Neurologic exam is nonfocal.      Her most recent laboratory tests show her white count is 3.4, hemoglobin is 12.8, platelets are 181,000.  Her sodium is 140, potassium 4.9, BUN is 19, creatinine 0.9.  AST is 11, ALT is 20,  alkaline phosphatase is 128.  Albumin is 3.5 with total protein of 7.5, total bilirubin is 0.5.  Her alpha-fetoprotein is less than 1.5.      My impression is that Ms. Leigh is doing very well now more than 1 year status post liver transplantation.  I have encouraged her to seek out some water aerobics which should be much easier on her back and joints and a good way to get exercise.  She does need to decrease her calories in order to prevent future weight gain as well.  I, otherwise, will not be making any change to her medical regimen.  They are contemplating a move to [facility], which may entail finding her a new Hepatology group as well; however, that is not going to happen in the immediate future.      Thank you very much for allowing me to participate in the care of this patient.  If you have any questions regarding my recommendations, please do not hesitate to contact me.       Chuck Mata MD      Professor of Medicine  [employer]      Executive Medical Director, Solid Organ Transplant Program  [facility]

## 2017-06-19 ENCOUNTER — TELEPHONE (OUTPATIENT)
Dept: TRANSPLANT | Facility: CLINIC | Age: 67
End: 2017-06-19

## 2017-06-19 DIAGNOSIS — K31.89 GASTRIC WALL THICKENING: Primary | ICD-10-CM

## 2017-06-20 ENCOUNTER — TELEPHONE (OUTPATIENT)
Dept: GASTROENTEROLOGY | Facility: CLINIC | Age: 67
End: 2017-06-20

## 2017-06-20 NOTE — TELEPHONE ENCOUNTER
Patient scheduled for EGD    Indication for procedure. Gastric wall thickening    Referring Provider. Chuck Mata MD    ? Not Needed    Arrival time verified? Yes, 3285    Facility location verified? Yes, [address] [facility]    Instructions given regarding prep and procedure    Prep Type NPO    Are you taking any anticoagulants or blood thinners? No    Instructions given? N/A    Electronic implanted devices? No    Pre procedure teaching completed? Yes    Transportation from procedure?     H&P / Pre op physical completed? N/A

## 2017-06-21 ENCOUNTER — HOSPITAL ENCOUNTER (OUTPATIENT)
Facility: CLINIC | Age: 67
Discharge: HOME | End: 2017-06-21
Attending: INTERNAL MEDICINE | Admitting: INTERNAL MEDICINE
Payer: MEDICARE

## 2017-06-21 ENCOUNTER — SURGERY (OUTPATIENT)
Age: 67
End: 2017-06-21

## 2017-06-21 VITALS
OXYGEN SATURATION: 97 % | RESPIRATION RATE: 21 BRPM | DIASTOLIC BLOOD PRESSURE: 92 MMHG | SYSTOLIC BLOOD PRESSURE: 135 MMHG

## 2017-06-21 PROCEDURE — 25000128 H RX IP 250 OP 636: Performed by: INTERNAL MEDICINE

## 2017-06-21 PROCEDURE — 40000104 ZZH STATISTIC MODERATE SEDATION < 10 MIN: Performed by: INTERNAL MEDICINE

## 2017-06-21 PROCEDURE — 25000132 ZZH RX MED GY IP 250 OP 250 PS 637: Mod: GY | Performed by: INTERNAL MEDICINE

## 2017-06-21 PROCEDURE — 25000125 ZZHC RX 250: Performed by: INTERNAL MEDICINE

## 2017-06-21 PROCEDURE — 43235 EGD DIAGNOSTIC BRUSH WASH: CPT | Performed by: INTERNAL MEDICINE

## 2017-06-21 RX ORDER — SIMETHICONE
LIQUID (ML) MISCELLANEOUS PRN
Status: DISCONTINUED | OUTPATIENT
Start: 2017-06-21 | End: 2017-06-21 | Stop reason: HOSPADM

## 2017-06-21 RX ORDER — FENTANYL CITRATE 50 UG/ML
INJECTION, SOLUTION INTRAMUSCULAR; INTRAVENOUS PRN
Status: DISCONTINUED | OUTPATIENT
Start: 2017-06-21 | End: 2017-06-21 | Stop reason: HOSPADM

## 2017-06-21 RX ADMIN — Medication 2 ML: at 15:25

## 2017-06-21 RX ADMIN — MIDAZOLAM HYDROCHLORIDE 2 MG: 1 INJECTION, SOLUTION INTRAMUSCULAR; INTRAVENOUS at 15:28

## 2017-06-21 RX ADMIN — FENTANYL CITRATE 50 MCG: 50 INJECTION, SOLUTION INTRAMUSCULAR; INTRAVENOUS at 15:33

## 2017-06-21 RX ADMIN — FENTANYL CITRATE 100 MCG: 50 INJECTION, SOLUTION INTRAMUSCULAR; INTRAVENOUS at 15:28

## 2017-06-21 RX ADMIN — MIDAZOLAM HYDROCHLORIDE 1 MG: 1 INJECTION, SOLUTION INTRAMUSCULAR; INTRAVENOUS at 15:31

## 2017-06-21 RX ADMIN — BENZOCAINE 1 SPRAY: 220 SPRAY, METERED PERIODONTAL at 15:27

## 2017-06-21 NOTE — IP AVS SNAPSHOT
[facility], [place], Endoscopy    [address] [address]    Phone:  [phone]                                       After Visit Summary   6/21/2017    Joselyn Leigh    MRN: [id number]           After Visit Summary Signature Page     I have received my discharge instructions, and my questions have been answered. I have discussed any challenges I see with this plan with the nurse or doctor.    ..........................................................................................................................................  Patient/Patient Representative Signature      ..........................................................................................................................................  Patient Representative Print Name and Relationship to Patient    ..................................................               ................................................  Date                                            Time    ..........................................................................................................................................  Reviewed by Signature/Title    ...................................................              ..............................................  Date                                                            Time

## 2017-06-21 NOTE — IP AVS SNAPSHOT
MRN:[id number]                      After Visit Summary   6/21/2017    Joselyn Leigh    MRN: [id number]           Thank you!     Thank you for choosing [facility] for your care. Our goal is always to provide you with excellent care. Hearing back from our patients is one way we can continue to improve our services. Please take a few minutes to complete the written survey that you may receive in the mail after you visit with us. Thank you!        Patient Information     Date Of Birth          1950        About your hospital stay     You were admitted on:  June 21, 2017 You last received care in the:  [facility], Endoscopy    You were discharged on:  June 21, 2017       Who to Call     For medical emergencies, please call 911.  For non-urgent questions about your medical care, please call your primary care provider or clinic, [phone]  For questions related to your surgery, please call your surgery clinic        Attending Provider     Provider Specialty    Keila Concepcoin MD Hepatology       Primary Care Provider Office Phone #    [facility] [phone]      Your next 10 appointments already scheduled     Sep 11, 2017  8:15 AM CDT   (Arrive by 8:00 AM)   Return Visit with Brian Ozuna MD   [facility] ([facility] [place])    [address] [place] [address]  [facility] [facility] [address] [phone]            Dec 18, 2017  1:15 PM CST   (Arrive by 1:00 PM)   Return Liver Transplant with Chuck Mata MD   [facility] Hepatology ([facility] [place])    [address] [place] [id number] [phone]              Further instructions from your care team       Discharge Instructions after  Upper Endoscopy (EGD)    Activity and Diet  You were given medicine for pain. You may be dizzy or sleepy.  For 24 hours:    Do not drive or use heavy equipment.    Do not make important decisions.    Do not  drink any alcohol.  ___ You may return to your regular diet.    Discomfort  You may have a sore throat for 2 to 3 days. It may help to:    Avoid hot liquids for 24 hours.    Use sore throat lozenges.    Gargle as needed with salt water up to 4 times a day. Mix 1 cup of warm water  with 1 teaspoon of salt. Do not swallow.    You may take Tylenol (acetaminophen) for pain unless your doctor has told you not to.    Do not take aspirin or ibuprofen (Advil, Motrin) or other NSAIDS  (anti-inflammatory drugs) for 1 days.    Follow-up  Per Dr. Forbes    When to call us:  Problems are rare. Call right away if you have:    Unusual throat pain or trouble swallowing    Unusual pain in belly or chest that is not relieved by belching or passing air    Black stools (tar-like looking bowel movement)    Temperature above 100.6  F. (37.5  C).    If you vomit blood or have severe pain, go to an emergency room.    If you have questions, call:  Monday to Friday, 7 a.m. to 4:30 p.m.: Endoscopy: [phone] (We may have to call you back)    After hours: Hospital: [phone] (Ask for the GI fellow on call)    Pending Results     No orders found from 6/19/2017 to 6/22/2017.            Admission Information     Date & Time Provider Department Dept. Phone    6/21/2017 Keila Concepcion MD [facility], [place], Endoscopy [phone]      Your Vitals Were     Blood Pressure Respirations Pulse Oximetry             140/104 14 96%         Paperless [employer] Information     [employer] gives you secure access to your electronic health record. If you see a primary care provider, you can also send messages to your care team and make appointments. If you have questions, please call your primary care clinic.  If you do not have a primary care provider, please call [phone] and they will assist you.        Care EveryWhere ID     This is your Care EveryWhere ID. This could be used by other organizations to access your [place] medical records  [id number]        Equal  Access to Services     [facility] AH: Hadii aad ku hadadonisluis Sung, waaxda luqadaha, qaybta kaalmanehemiah mccracken. So [facility] [phone].    ATENCIÓN: Si habla español, tiene a irving disposición servicios gratuitos de asistencia lingüística. Llame al [phone].    We comply with applicable federal civil rights laws and [place] laws. We do not discriminate on the basis of race, color, national origin, age, disability sex, sexual orientation or gender identity.               Review of your medicines      UNREVIEWED medicines. Ask your doctor about these medicines        Dose / Directions    calcium-vitamin D 600-400 MG-UNIT per tablet   Commonly known as:  CALTRATE        Dose:  1 tablet   Take 1 tablet by mouth 2 times daily   Refills:  0       losartan 100 MG tablet   Commonly known as:  COZAAR   Used for:  Essential hypertension with goal blood pressure less than 140/90        Dose:  100 mg   Take 1 tablet (100 mg) by mouth daily   Quantity:  30 tablet   Refills:  5       magnesium oxide 400 (241.3 MG) MG tablet   Commonly known as:  MAG-OX   Used for:  Low magnesium levels        Dose:  400 mg   Take 1 tablet (400 mg) by mouth 2 times daily   Quantity:  60 tablet   Refills:  11       Multi-vitamin Tabs tablet        Dose:  1 tablet   Take 1 tablet by mouth daily prenatal   Refills:  0       OSTEO BI-FLEX ADV DOUBLE ST PO        Take by mouth daily   Refills:  0       priLOSEC 20 MG CR capsule   Generic drug:  omeprazole        Dose:  20 mg   Take 20 mg by mouth daily   Refills:  0       tacrolimus 1 MG capsule   Commonly known as:  PROGRAF - GENERIC EQUIVALENT   Used for:  Liver replaced by transplant (H), History of immunosuppression therapy        Dose:  3 mg   Take 3 capsules (3 mg) by mouth every 12 hours   Quantity:  180 capsule   Refills:  11                Protect others around you: Learn how to safely use, store and throw away your medicines at  www.disposemymeds.org.             Medication List: This is a list of all your medications and when to take them. Check marks below indicate your daily home schedule. Keep this list as a reference.      Medications           Morning Afternoon Evening Bedtime As Needed    calcium-vitamin D 600-400 MG-UNIT per tablet   Commonly known as:  CALTRATE   Take 1 tablet by mouth 2 times daily                                losartan 100 MG tablet   Commonly known as:  COZAAR   Take 1 tablet (100 mg) by mouth daily                                magnesium oxide 400 (241.3 MG) MG tablet   Commonly known as:  MAG-OX   Take 1 tablet (400 mg) by mouth 2 times daily                                Multi-vitamin Tabs tablet   Take 1 tablet by mouth daily prenatal                                OSTEO BI-FLEX ADV DOUBLE ST PO   Take by mouth daily                                priLOSEC 20 MG CR capsule   Take 20 mg by mouth daily   Generic drug:  omeprazole                                tacrolimus 1 MG capsule   Commonly known as:  PROGRAF - GENERIC EQUIVALENT   Take 3 capsules (3 mg) by mouth every 12 hours

## 2017-06-21 NOTE — DISCHARGE INSTRUCTIONS
Discharge Instructions after  Upper Endoscopy (EGD)    Activity and Diet  You were given medicine for pain. You may be dizzy or sleepy.  For 24 hours:    Do not drive or use heavy equipment.    Do not make important decisions.    Do not drink any alcohol.  ___ You may return to your regular diet.    Discomfort  You may have a sore throat for 2 to 3 days. It may help to:    Avoid hot liquids for 24 hours.    Use sore throat lozenges.    Gargle as needed with salt water up to 4 times a day. Mix 1 cup of warm water  with 1 teaspoon of salt. Do not swallow.    You may take Tylenol (acetaminophen) for pain unless your doctor has told you not to.    Do not take aspirin or ibuprofen (Advil, Motrin) or other NSAIDS  (anti-inflammatory drugs) for 1 days.    Follow-up  Per Dr. Forbes    When to call us:  Problems are rare. Call right away if you have:    Unusual throat pain or trouble swallowing    Unusual pain in belly or chest that is not relieved by belching or passing air    Black stools (tar-like looking bowel movement)    Temperature above 100.6  F. (37.5  C).    If you vomit blood or have severe pain, go to an emergency room.    If you have questions, call:  Monday to Friday, 7 a.m. to 4:30 p.m.: Endoscopy: [phone] (We may have to call you back)    After hours: Hospital: [phone] (Ask for the GI fellow on call)

## 2017-06-21 NOTE — OR NURSING
EGD under conscious sedation wearing 2lpm 02 via NC.  Pt on left side for exam, tolerated procedure.  Post exam pt denies SOB/CP/abd pain

## 2017-06-22 LAB — UPPER GI ENDOSCOPY: NORMAL

## 2017-07-07 ENCOUNTER — TELEPHONE (OUTPATIENT)
Dept: TRANSPLANT | Facility: CLINIC | Age: 67
End: 2017-07-07

## 2017-07-07 DIAGNOSIS — Z94.4 LIVER REPLACED BY TRANSPLANT (H): ICD-10-CM

## 2017-07-07 DIAGNOSIS — Z79.60 LONG-TERM USE OF IMMUNOSUPPRESSANT MEDICATION: ICD-10-CM

## 2017-07-07 DIAGNOSIS — Z94.4 HISTORY OF LIVER TRANSPLANT (H): ICD-10-CM

## 2017-07-07 LAB
ALBUMIN SERPL-MCNC: 3.5 G/DL (ref 3.4–5)
ALBUMIN UR-MCNC: 30 MG/DL
ALP SERPL-CCNC: 163 U/L (ref 40–150)
ALT SERPL W P-5'-P-CCNC: 232 U/L (ref 0–50)
AMORPH CRY #/AREA URNS HPF: ABNORMAL /HPF
ANION GAP SERPL CALCULATED.3IONS-SCNC: 3 MMOL/L (ref 3–14)
APPEARANCE UR: CLEAR
AST SERPL W P-5'-P-CCNC: 53 U/L (ref 0–45)
BACTERIA #/AREA URNS HPF: ABNORMAL /HPF
BILIRUB DIRECT SERPL-MCNC: 0.1 MG/DL (ref 0–0.2)
BILIRUB SERPL-MCNC: 0.5 MG/DL (ref 0.2–1.3)
BILIRUB UR QL STRIP: ABNORMAL
BUN SERPL-MCNC: 23 MG/DL (ref 7–30)
CALCIUM SERPL-MCNC: 9.1 MG/DL (ref 8.5–10.1)
CHLORIDE SERPL-SCNC: 105 MMOL/L (ref 94–109)
CHOLEST SERPL-MCNC: 192 MG/DL
CO2 SERPL-SCNC: 32 MMOL/L (ref 20–32)
COLOR UR AUTO: YELLOW
CREAT SERPL-MCNC: 0.9 MG/DL (ref 0.52–1.04)
CREAT UR-MCNC: 387 MG/DL
ERYTHROCYTE [DISTWIDTH] IN BLOOD BY AUTOMATED COUNT: 13.3 % (ref 10–15)
GFR SERPL CREATININE-BSD FRML MDRD: 62 ML/MIN/1.7M2
GLUCOSE SERPL-MCNC: 153 MG/DL (ref 70–99)
GLUCOSE UR STRIP-MCNC: NEGATIVE MG/DL
GRAN CASTS #/AREA URNS LPF: ABNORMAL /LPF
HCT VFR BLD AUTO: 35 % (ref 35–47)
HDLC SERPL-MCNC: 39 MG/DL
HGB BLD-MCNC: 12.1 G/DL (ref 11.7–15.7)
HGB UR QL STRIP: NEGATIVE
KETONES UR STRIP-MCNC: ABNORMAL MG/DL
LDLC SERPL CALC-MCNC: 114 MG/DL
LEUKOCYTE ESTERASE UR QL STRIP: NEGATIVE
MAGNESIUM SERPL-MCNC: 2 MG/DL (ref 1.6–2.3)
MCH RBC QN AUTO: 29.8 PG (ref 26.5–33)
MCHC RBC AUTO-ENTMCNC: 34.6 G/DL (ref 31.5–36.5)
MCV RBC AUTO: 86 FL (ref 78–100)
MUCOUS THREADS #/AREA URNS LPF: PRESENT /LPF
NITRATE UR QL: NEGATIVE
NON-SQ EPI CELLS #/AREA URNS LPF: ABNORMAL /LPF
NONHDLC SERPL-MCNC: 153 MG/DL
PH UR STRIP: 6 PH (ref 5–7)
PHOSPHATE SERPL-MCNC: 4.3 MG/DL (ref 2.5–4.5)
PLATELET # BLD AUTO: 166 10E9/L (ref 150–450)
POTASSIUM SERPL-SCNC: 5 MMOL/L (ref 3.4–5.3)
PROT SERPL-MCNC: 7.3 G/DL (ref 6.8–8.8)
PROT UR-MCNC: 0.56 G/L
PROT/CREAT 24H UR: 0.14 G/G CR (ref 0–0.2)
RBC # BLD AUTO: 4.06 10E12/L (ref 3.8–5.2)
RBC #/AREA URNS AUTO: ABNORMAL /HPF (ref 0–2)
SODIUM SERPL-SCNC: 140 MMOL/L (ref 133–144)
SP GR UR STRIP: 1.01 (ref 1–1.03)
TACROLIMUS BLD-MCNC: 6.2 UG/L (ref 5–15)
TME LAST DOSE: NORMAL H
TRIGL SERPL-MCNC: 194 MG/DL
URN SPEC COLLECT METH UR: ABNORMAL
UROBILINOGEN UR STRIP-ACNC: 0.2 EU/DL (ref 0.2–1)
WBC # BLD AUTO: 3.2 10E9/L (ref 4–11)
WBC #/AREA URNS AUTO: ABNORMAL /HPF (ref 0–2)

## 2017-07-07 PROCEDURE — 80197 ASSAY OF TACROLIMUS: CPT | Performed by: FAMILY MEDICINE

## 2017-07-07 PROCEDURE — 80076 HEPATIC FUNCTION PANEL: CPT | Performed by: FAMILY MEDICINE

## 2017-07-07 PROCEDURE — 83735 ASSAY OF MAGNESIUM: CPT | Performed by: FAMILY MEDICINE

## 2017-07-07 PROCEDURE — 36415 COLL VENOUS BLD VENIPUNCTURE: CPT | Performed by: FAMILY MEDICINE

## 2017-07-07 PROCEDURE — 80048 BASIC METABOLIC PNL TOTAL CA: CPT | Performed by: FAMILY MEDICINE

## 2017-07-07 PROCEDURE — 84156 ASSAY OF PROTEIN URINE: CPT | Performed by: FAMILY MEDICINE

## 2017-07-07 PROCEDURE — 84100 ASSAY OF PHOSPHORUS: CPT | Performed by: FAMILY MEDICINE

## 2017-07-07 PROCEDURE — 80061 LIPID PANEL: CPT | Performed by: FAMILY MEDICINE

## 2017-07-07 PROCEDURE — 81001 URINALYSIS AUTO W/SCOPE: CPT | Performed by: FAMILY MEDICINE

## 2017-07-07 PROCEDURE — 85027 COMPLETE CBC AUTOMATED: CPT | Performed by: FAMILY MEDICINE

## 2017-07-10 ENCOUNTER — TELEPHONE (OUTPATIENT)
Dept: TRANSPLANT | Facility: CLINIC | Age: 67
End: 2017-07-10

## 2017-07-10 DIAGNOSIS — Z79.60 LONG-TERM USE OF IMMUNOSUPPRESSANT MEDICATION: ICD-10-CM

## 2017-07-10 DIAGNOSIS — Z94.4 HISTORY OF LIVER TRANSPLANT (H): Primary | ICD-10-CM

## 2017-07-10 NOTE — TELEPHONE ENCOUNTER
"Per review w/ re: elevated LFT\"s.   Plan repeat labs and U/S of liver.  Spoke with Joselyn about liver labs, she reports not feeling well after the EGD she had the end of June, but lately has been feeling okay.  Reviewed recommendation per Dr.Lake Alves reports that they are in the middle of selling their house, etc.  She prefers to have the U/S close to home, has gone to [facility] ([facility] in [place] in the past for imaging.  She could also go to [facility] physician clinic at [place].  Will send orders and set up appt for her at [place].  She will go into [facility] tomorrow for repeat labs, skipping tacrolimus level.  "

## 2017-07-10 NOTE — Clinical Note
Please schedule Joselyn for U/S liver and abdominal for Wed, hopefully or tomorrow.   She is aware and knows you will call.  She lives in [place].

## 2017-07-11 DIAGNOSIS — Z94.4 HISTORY OF LIVER TRANSPLANT (H): ICD-10-CM

## 2017-07-11 DIAGNOSIS — Z79.60 LONG-TERM USE OF IMMUNOSUPPRESSANT MEDICATION: ICD-10-CM

## 2017-07-11 LAB
ALBUMIN SERPL-MCNC: 3.7 G/DL (ref 3.4–5)
ALP SERPL-CCNC: 129 U/L (ref 40–150)
ALT SERPL W P-5'-P-CCNC: 78 U/L (ref 0–50)
ANION GAP SERPL CALCULATED.3IONS-SCNC: 5 MMOL/L (ref 3–14)
AST SERPL W P-5'-P-CCNC: 10 U/L (ref 0–45)
BILIRUB DIRECT SERPL-MCNC: <0.1 MG/DL (ref 0–0.2)
BILIRUB SERPL-MCNC: 0.6 MG/DL (ref 0.2–1.3)
BUN SERPL-MCNC: 32 MG/DL (ref 7–30)
CALCIUM SERPL-MCNC: 9.4 MG/DL (ref 8.5–10.1)
CHLORIDE SERPL-SCNC: 107 MMOL/L (ref 94–109)
CO2 SERPL-SCNC: 29 MMOL/L (ref 20–32)
CREAT SERPL-MCNC: 0.84 MG/DL (ref 0.52–1.04)
GFR SERPL CREATININE-BSD FRML MDRD: 68 ML/MIN/1.7M2
GLUCOSE SERPL-MCNC: 135 MG/DL (ref 70–99)
POTASSIUM SERPL-SCNC: 4.9 MMOL/L (ref 3.4–5.3)
PROT SERPL-MCNC: 7.6 G/DL (ref 6.8–8.8)
SODIUM SERPL-SCNC: 141 MMOL/L (ref 133–144)

## 2017-07-11 PROCEDURE — 36415 COLL VENOUS BLD VENIPUNCTURE: CPT | Performed by: FAMILY MEDICINE

## 2017-07-11 PROCEDURE — 80076 HEPATIC FUNCTION PANEL: CPT | Performed by: FAMILY MEDICINE

## 2017-07-11 PROCEDURE — 80048 BASIC METABOLIC PNL TOTAL CA: CPT | Performed by: FAMILY MEDICINE

## 2017-08-01 DIAGNOSIS — Z79.60 LONG-TERM USE OF IMMUNOSUPPRESSANT MEDICATION: ICD-10-CM

## 2017-08-01 DIAGNOSIS — Z94.4 LIVER REPLACED BY TRANSPLANT (H): Primary | ICD-10-CM

## 2017-08-01 DIAGNOSIS — Z94.4 LIVER REPLACED BY TRANSPLANT (H): ICD-10-CM

## 2017-08-01 LAB
ALBUMIN SERPL-MCNC: 3.8 G/DL (ref 3.4–5)
ALP SERPL-CCNC: 107 U/L (ref 40–150)
ALT SERPL W P-5'-P-CCNC: 25 U/L (ref 0–50)
ANION GAP SERPL CALCULATED.3IONS-SCNC: 9 MMOL/L (ref 3–14)
AST SERPL W P-5'-P-CCNC: 7 U/L (ref 0–45)
BILIRUB DIRECT SERPL-MCNC: 0.2 MG/DL (ref 0–0.2)
BILIRUB SERPL-MCNC: 0.6 MG/DL (ref 0.2–1.3)
BUN SERPL-MCNC: 39 MG/DL (ref 7–30)
CALCIUM SERPL-MCNC: 9.1 MG/DL (ref 8.5–10.1)
CHLORIDE SERPL-SCNC: 107 MMOL/L (ref 94–109)
CO2 SERPL-SCNC: 27 MMOL/L (ref 20–32)
CREAT SERPL-MCNC: 0.87 MG/DL (ref 0.52–1.04)
ERYTHROCYTE [DISTWIDTH] IN BLOOD BY AUTOMATED COUNT: 13.7 % (ref 10–15)
GFR SERPL CREATININE-BSD FRML MDRD: 65 ML/MIN/1.7M2
GLUCOSE SERPL-MCNC: 131 MG/DL (ref 70–99)
HCT VFR BLD AUTO: 35.6 % (ref 35–47)
HGB BLD-MCNC: 12.3 G/DL (ref 11.7–15.7)
MAGNESIUM SERPL-MCNC: 1.9 MG/DL (ref 1.6–2.3)
MCH RBC QN AUTO: 29.8 PG (ref 26.5–33)
MCHC RBC AUTO-ENTMCNC: 34.6 G/DL (ref 31.5–36.5)
MCV RBC AUTO: 86 FL (ref 78–100)
PLATELET # BLD AUTO: 159 10E9/L (ref 150–450)
POTASSIUM SERPL-SCNC: 4.9 MMOL/L (ref 3.4–5.3)
PROT SERPL-MCNC: 7.3 G/DL (ref 6.8–8.8)
RBC # BLD AUTO: 4.13 10E12/L (ref 3.8–5.2)
SODIUM SERPL-SCNC: 143 MMOL/L (ref 133–144)
TACROLIMUS BLD-MCNC: 5.5 UG/L (ref 5–15)
TME LAST DOSE: NORMAL H
WBC # BLD AUTO: 3 10E9/L (ref 4–11)

## 2017-08-01 PROCEDURE — 80076 HEPATIC FUNCTION PANEL: CPT | Performed by: INTERNAL MEDICINE

## 2017-08-01 PROCEDURE — 36415 COLL VENOUS BLD VENIPUNCTURE: CPT | Performed by: INTERNAL MEDICINE

## 2017-08-01 PROCEDURE — 85027 COMPLETE CBC AUTOMATED: CPT | Performed by: INTERNAL MEDICINE

## 2017-08-01 PROCEDURE — 83735 ASSAY OF MAGNESIUM: CPT | Performed by: INTERNAL MEDICINE

## 2017-08-01 PROCEDURE — 80048 BASIC METABOLIC PNL TOTAL CA: CPT | Performed by: INTERNAL MEDICINE

## 2017-08-01 PROCEDURE — 80197 ASSAY OF TACROLIMUS: CPT | Performed by: INTERNAL MEDICINE

## 2017-08-01 NOTE — PROGRESS NOTES
Spoke with Joselyn who reports that she and her  have bought a house in [place], are very excited to be moving there, have sold their house in [place].    Joselyn is concerned about returning for labs, etc.  Provided information about doing post transplant labs locally, at their new clinic in [place].    Requested that she call with clinic info, etc there, orders can be sent there.  Joselyn did do labs today.

## 2017-08-28 DIAGNOSIS — Z94.4 LIVER REPLACED BY TRANSPLANT (H): Primary | ICD-10-CM

## 2017-10-23 ENCOUNTER — TRANSFERRED RECORDS (OUTPATIENT)
Dept: HEALTH INFORMATION MANAGEMENT | Facility: CLINIC | Age: 67
End: 2017-10-23

## 2017-11-02 ENCOUNTER — TRANSFERRED RECORDS (OUTPATIENT)
Dept: HEALTH INFORMATION MANAGEMENT | Facility: CLINIC | Age: 67
End: 2017-11-02

## 2017-11-06 ENCOUNTER — TELEPHONE (OUTPATIENT)
Dept: TRANSPLANT | Facility: CLINIC | Age: 67
End: 2017-11-06

## 2017-11-06 DIAGNOSIS — Z92.25 HISTORY OF IMMUNOSUPPRESSION THERAPY: ICD-10-CM

## 2017-11-06 DIAGNOSIS — Z94.4 LIVER REPLACED BY TRANSPLANT (H): ICD-10-CM

## 2017-11-07 ENCOUNTER — TELEPHONE (OUTPATIENT)
Dept: TRANSPLANT | Facility: CLINIC | Age: 67
End: 2017-11-07

## 2017-11-07 RX ORDER — TACROLIMUS 1 MG/1
CAPSULE ORAL
Qty: 150 CAPSULE | Refills: 11 | Status: SHIPPED | OUTPATIENT
Start: 2017-11-07 | End: 2018-07-19

## 2017-11-07 NOTE — TELEPHONE ENCOUNTER
Spoke with Joselyn about her labs done this past week.  She has gotten her tacrolimus level, it is 8.2.  Reviewed current dose 3mg Q 12 hours, dose decreased to 2mg pm dose, continue 3mg in am.     Discussed with Joselyn that we have not received the tacrolimus level yet, will send for it from her clinic.

## 2017-11-07 NOTE — TELEPHONE ENCOUNTER
Joselyn upset with bills that they have received for her 's melanoma surgery.   They are very frustrated in how the surgery and f/u care was billed.  Encouraged her to contact patient relations about this, to speak with someone who may be able to look into the situation for her.  Plan made to send her patient relations # for the clinic and [facility] as he had the surgery done there.

## 2017-11-07 NOTE — TELEPHONE ENCOUNTER
Joselyn states that she won't be returning to [facility] for appts, had spoken with  and he provided the name of an gastroenterologist that she can follow with in [place], also has an oncologist to follow up with in [place].     She asks about taking iron supplements, directed her to discuss with the gastroenterologist when she is seen next.  Currently her hgb is 12.3, normal.

## 2017-11-20 DIAGNOSIS — I10 ESSENTIAL HYPERTENSION WITH GOAL BLOOD PRESSURE LESS THAN 140/90: ICD-10-CM

## 2017-11-20 NOTE — TELEPHONE ENCOUNTER
losartan (COZAAR) 100 MG tablet      Last Written Prescription Date: 5/1/17  Last Fill Quantity: 30, # refills: 5  Last Office Visit with [facility] or [facility] prescribing provider: 5/1/17       Potassium   Date Value Ref Range Status   08/01/2017 4.9 3.4 - 5.3 mmol/L Final     Creatinine   Date Value Ref Range Status   08/01/2017 0.87 0.52 - 1.04 mg/dL Final     BP Readings from Last 3 Encounters:   06/21/17 (!) 135/92   06/13/17 129/85   06/09/17 136/89

## 2017-11-21 ENCOUNTER — TELEPHONE (OUTPATIENT)
Dept: TRANSPLANT | Facility: CLINIC | Age: 67
End: 2017-11-21

## 2017-11-21 NOTE — TELEPHONE ENCOUNTER
Joselyn calling with questions about EGD, has had a CT scan in [place], oncology told her there is no cancer noted, but she is being called to plan an EGD in [place].  Concerned because she had one this past summer.  Plan made to send a copy of the EGD report to .   Pt will request copy of her CT scan report to be faxed to the [facility]plant [facility].   Pt reaffirms that she is planning to do her care in [place].  She just wants to find out what  would recommend.

## 2017-11-21 NOTE — TELEPHONE ENCOUNTER
Dr.Kara Decker contacting to request copy of post transplant lab orders, and plan re:  Tacrolimus level.   Joselyn has been in to see her, requesting that Rebecca follow her transplant care.    Message left for Rebecca that pt's tacrolimus level, 12 hour goal is 4-7, and will send post tx lab orders to her office.

## 2017-11-22 DIAGNOSIS — I10 ESSENTIAL HYPERTENSION WITH GOAL BLOOD PRESSURE LESS THAN 140/90: ICD-10-CM

## 2017-11-22 RX ORDER — LOSARTAN POTASSIUM 100 MG/1
TABLET ORAL
Qty: 30 TABLET | Refills: 0 | Status: SHIPPED | OUTPATIENT
Start: 2017-11-22

## 2017-11-24 RX ORDER — LOSARTAN POTASSIUM 100 MG/1
TABLET ORAL
Qty: 90 TABLET | Refills: 0 | OUTPATIENT
Start: 2017-11-24

## 2018-05-02 ENCOUNTER — TELEPHONE (OUTPATIENT)
Dept: PHARMACY | Facility: CLINIC | Age: 68
End: 2018-05-02

## 2018-05-10 ENCOUNTER — TRANSFERRED RECORDS (OUTPATIENT)
Dept: HEALTH INFORMATION MANAGEMENT | Facility: CLINIC | Age: 68
End: 2018-05-10

## 2018-05-11 ENCOUNTER — TELEPHONE (OUTPATIENT)
Dept: TRANSPLANT | Facility: CLINIC | Age: 68
End: 2018-05-11

## 2018-05-11 DIAGNOSIS — Z94.4 LIVER REPLACED BY TRANSPLANT (H): Primary | ICD-10-CM

## 2018-05-11 NOTE — LETTER
OUTPATIENT OR TRANSITIONAL CARE  LABORATORY TEST ORDER  [facility]: [phone]    Patient Name: Joselyn Leigh  Transplant Date: 5/10/2016   YOB: 1950  Issue Date: 05/11/18   [facility] MR#: [id number]  Expiration Date: 05/11/19       Diagnoses: [x]      Liver Transplant (ICD-10 Z94.4)    [x]      Long term use of medications (ICD-10 Z79.899)     Please fax results to [phone]    Frequency: Every 2 months, and PRN        [x] CBC with Platelets   [x] Basic Metabolic Panel (Sodium, Potassium, Chloride, CO2, Creatinine, Urea Nitrogen, Glucose, Calcium)  [x] Magnesium  [x] Hepatic panel (Albumin, Alk Phos, ALT, AST, Direct and Total Bili)  [x] Tacrolimus drug level - 12 hour trough, please document time of last dose       Other Frequency: annually in July 2018  [x]      Fasting lipid panel  [x] Random urine protein  [x] Urinalysis with reflex to micro    If you have questions, please call [phone] or [phone].    .

## 2018-05-11 NOTE — TELEPHONE ENCOUNTER
Please call Joselyn about labs done recently, inform her of mild elevation in her liver function tests.   Check with her if she has been sick?  Has she missed any tacrolimus doses?   Her tacrolimus level per care everywhere is 5.9, good.   Ask her to repeat her labs in the next week or 2.   Check where she does her labs, and send orders if needed.

## 2018-07-19 DIAGNOSIS — Z92.25 HISTORY OF IMMUNOSUPPRESSION THERAPY: ICD-10-CM

## 2018-07-19 DIAGNOSIS — Z94.4 LIVER REPLACED BY TRANSPLANT (H): ICD-10-CM

## 2018-07-19 RX ORDER — TACROLIMUS 1 MG/1
2 CAPSULE ORAL EVERY 12 HOURS
Qty: 120 CAPSULE | Refills: 11 | Status: SHIPPED | OUTPATIENT
Start: 2018-07-19 | End: 2018-09-21

## 2018-07-19 NOTE — TELEPHONE ENCOUNTER
Patient Call: Transplant Lab/Orders  Route to LPN  Post Transplant Days: 800  When patient is less than 60 days post-transplant, route high priority    Reason for Call: patient has questions about her Tacrolimus level elevated patient will be going out of town this weekend.  Callback needed? Yes    Return Call Needed  Same as documented in contacts section  When to return call?: Same day: Route High Priority

## 2018-07-19 NOTE — TELEPHONE ENCOUNTER
Talked to Joselyn about her Tac level of 8.4, PT was talking 3 mg in the AM, and 2 mg in the PM. She will lower to 2 mg Q 12 hours now. She also had questions about her glucose and how its been high the past year and she is nervous about diabetes. I told her she should talk to her primary about this and they can guide her in the right direction. She understood and will do so

## 2018-09-11 ENCOUNTER — TELEPHONE (OUTPATIENT)
Dept: TRANSPLANT | Facility: CLINIC | Age: 68
End: 2018-09-11

## 2018-09-11 NOTE — TELEPHONE ENCOUNTER
Patient Call: Transplant Lab/Orders  Route to LPN  Post Transplant Days: 854  When patient is less than 60 days post-transplant, route high priority    Reason for Call: Annual lab reorder  Callback needed? Yes    Return Call Needed  Same as documented in contacts section  When to return call?: Greater than one day: Route standard priority     Please send annual/standing order to [facility]  [phone]

## 2018-09-21 DIAGNOSIS — Z94.4 LIVER REPLACED BY TRANSPLANT (H): ICD-10-CM

## 2018-09-21 DIAGNOSIS — Z92.25 HISTORY OF IMMUNOSUPPRESSION THERAPY: ICD-10-CM

## 2018-09-21 RX ORDER — TACROLIMUS 1 MG/1
CAPSULE ORAL
Qty: 90 CAPSULE | Refills: 11 | Status: SHIPPED | OUTPATIENT
Start: 2018-09-21 | End: 2019-01-24

## 2018-09-21 NOTE — TELEPHONE ENCOUNTER
Please call Joselyn about recent tacrolimus level = 7.8, this is higher than 12 hour goal 4-7,  Please reduce dose to 1mg in the pm,  Continue 2mg in the am, taking every 12 hours.

## 2019-01-24 ENCOUNTER — TELEPHONE (OUTPATIENT)
Dept: TRANSPLANT | Facility: CLINIC | Age: 69
End: 2019-01-24

## 2019-01-24 DIAGNOSIS — Z92.25 HISTORY OF IMMUNOSUPPRESSION THERAPY: ICD-10-CM

## 2019-01-24 DIAGNOSIS — Z94.4 LIVER REPLACED BY TRANSPLANT (H): ICD-10-CM

## 2019-01-24 RX ORDER — TACROLIMUS 1 MG/1
1 CAPSULE ORAL 2 TIMES DAILY
Qty: 60 CAPSULE | Refills: 11 | Status: SHIPPED | OUTPATIENT
Start: 2019-01-24 | End: 2019-08-15

## 2019-01-24 NOTE — TELEPHONE ENCOUNTER
Joselyn calling about recent labs, specifically elevated cholesterol.  Her pcp requested that she review this with transplant.   Provided detail of recommendations for pt in starting a statin, and possible interactions with tacrolimus( a CNI).    Transplant would recommmend starting statin on a low dose, and checking CPK and LFT's at one week,  Then CPK q 2months x 2, and repeating lipids in 3 months.     Joselyn requests a letter detailing those recommendations, and she will review these with her pcp.     She knows that she can call back if has other questions.

## 2019-01-24 NOTE — LETTER
Patient Name: Joselyn Leigh  Transplant Date: 5/10/2016   YOB: 1950         Diagnoses: [x]      Liver Transplant (ICD-10 Z94.4)    [x]      Long term use of medications (ICD-10 Z79.899)           Protocol when starting a statin to lower cholesterol:    Start statin at low dose due to interaction with CNI's (tacrolimus)  Check CPK and LFT's at 1 week after starting statin  Then check CPK every 2 months x 2  Repeat lipids in 3month        If you have questions, please call [phone] or [phone].       Nandini Gonzalez RN, BSN  Transplant Coordinator  [facility]  01/24/2019 10:05 AM

## 2019-01-24 NOTE — TELEPHONE ENCOUNTER
Please call Joselyn about tacrolimus level of 6.4,  Verify if a 12 hour level.  Her tacrolimus dose could be lowered if it was 12 hours or longer.

## 2019-03-11 ENCOUNTER — TELEPHONE (OUTPATIENT)
Dept: TRANSPLANT | Facility: CLINIC | Age: 69
End: 2019-03-11

## 2019-03-11 NOTE — TELEPHONE ENCOUNTER
Patient Call: General  Route to LPN    Reason for call: patient would like to discuss her labs and other questions.    Call back needed? Yes    Return Call Needed  Same as documented in contacts section  When to return call?: Same day: Route High Priority

## 2019-05-08 ENCOUNTER — TELEPHONE (OUTPATIENT)
Dept: TRANSPLANT | Facility: CLINIC | Age: 69
End: 2019-05-08

## 2019-05-08 NOTE — LETTER
OUTPATIENT OR TRANSITIONAL CARE  LABORATORY TEST ORDER   [facility]  Fax# [phone]    Patient Name: Joselyn Leigh  Transplant Date: 5/10/2016   YOB: 1950  Issue Date: 05/09/19   [facility] MR#: [id number]  Expiration Date: 05/09/2020       Diagnoses: [x]      Liver Transplant (ICD-10 Z94.4)    [x]      Long term use of medications (ICD-10 Z79.899)     Please fax results to [phone]    Frequency: every 2-3 months and PRN    [x] CBC with Platelets   [x] Basic Metabolic Panel (Sodium, Potassium, Chloride, CO2, Creatinine, Urea Nitrogen, Glucose, Calcium)  [x]        Magnesium  [x] Hepatic panel (Albumin, Alk Phos, ALT, AST, Direct and Total Bili)  [x] Tacrolimus drug level - 12 hour trough, please document time of last dose     Other Frequency: annually due  July 2019  [x]      Fasting lipid panel  [x] Random urine protein  [x] Urinalysis with reflex to micro    If you have questions, please call [phone] or [phone].    .

## 2019-05-08 NOTE — TELEPHONE ENCOUNTER
Patient Call: Transplant Lab/Orders  Route to LPN    Reason for Call: Annual lab reorder  Callback needed? No   [facility]

## 2019-05-09 ENCOUNTER — TELEPHONE (OUTPATIENT)
Dept: TRANSPLANT | Facility: CLINIC | Age: 69
End: 2019-05-09

## 2019-05-09 DIAGNOSIS — Z13.220 LIPID SCREENING: ICD-10-CM

## 2019-05-09 DIAGNOSIS — Z94.4 LIVER REPLACED BY TRANSPLANT (H): ICD-10-CM

## 2019-05-09 NOTE — TELEPHONE ENCOUNTER
Reviewed Tacrolimus level 4.4, no change in dose.  Labs are good,  Glucose 156 and she was fasting.  Encouraged her to see her pcp, review labs and blood glucose, etc.   She states she will do that.

## 2019-05-09 NOTE — TELEPHONE ENCOUNTER
Patient Call: Transplant Lab/Orders  Route to LPN  Post Transplant Days: 1094  When patient is less than 60 days post-transplant, route high priority    Reason for Call: Discuss lab results; which results? Tacro  Callback needed? Yes    Return Call Needed  Same as documented in contacts section  When to return call?: Greater than one day: Route standard priority

## 2019-08-14 DIAGNOSIS — Z92.25 HISTORY OF IMMUNOSUPPRESSION THERAPY: ICD-10-CM

## 2019-08-14 DIAGNOSIS — Z94.4 LIVER REPLACED BY TRANSPLANT (H): ICD-10-CM

## 2019-08-14 NOTE — TELEPHONE ENCOUNTER
Please call pt about tacrolimus level 2.7, too low.   Verify that it was a 12 hour level, and increase dose by 1mg in the am.

## 2019-08-15 NOTE — TELEPHONE ENCOUNTER
Spoke to pt and confirmed that the tacro level was a 12 hour trough. Instructed pt to change tacro to 2mg am, 1mg pm and plan to recheck labs in 2 months. Pt understood.

## 2019-08-16 RX ORDER — TACROLIMUS 1 MG/1
CAPSULE ORAL
Qty: 90 CAPSULE | Refills: 11 | Status: SHIPPED | OUTPATIENT
Start: 2019-08-16 | End: 2020-12-28

## 2019-10-14 ENCOUNTER — TELEPHONE (OUTPATIENT)
Dept: TRANSPLANT | Facility: CLINIC | Age: 69
End: 2019-10-14

## 2019-10-14 NOTE — TELEPHONE ENCOUNTER
Patient Call: General  Route to LPN    Reason for call: please connect with pt regarding her WBC    Call back needed? Yes    Return Call Needed  Same as documented in contacts section  When to return call?: Greater than one day: Route standard priority

## 2019-10-14 NOTE — TELEPHONE ENCOUNTER
Reviewed 10/10/19 labs with Joselyn. Her WBC is low but not below 3 which would require additional differential and more frequent draws. We will continue to monitor closely and if her WBC drops lower we will address. No change at this time in medications or plan of care.

## 2019-11-03 ENCOUNTER — HEALTH MAINTENANCE LETTER (OUTPATIENT)
Age: 69
End: 2019-11-03

## 2020-02-10 ENCOUNTER — HEALTH MAINTENANCE LETTER (OUTPATIENT)
Age: 70
End: 2020-02-10

## 2020-02-18 ENCOUNTER — TRANSFERRED RECORDS (OUTPATIENT)
Dept: HEALTH INFORMATION MANAGEMENT | Facility: CLINIC | Age: 70
End: 2020-02-18

## 2020-03-20 ENCOUNTER — TELEPHONE (OUTPATIENT)
Dept: TRANSPLANT | Facility: CLINIC | Age: 70
End: 2020-03-20

## 2020-03-20 NOTE — TELEPHONE ENCOUNTER
Patient Call: Transplant Lab/Orders  Route to LPN  Post Transplant Days: 1410  When patient is less than 60 days post-transplant, route high priority    Reason for Call: Annual lab reorder [facility]  Callback needed? No

## 2020-03-23 DIAGNOSIS — Z13.220 LIPID SCREENING: ICD-10-CM

## 2020-03-23 DIAGNOSIS — Z94.4 LIVER REPLACED BY TRANSPLANT (H): ICD-10-CM

## 2020-04-14 ENCOUNTER — TELEPHONE (OUTPATIENT)
Dept: TRANSPLANT | Facility: CLINIC | Age: 70
End: 2020-04-14

## 2020-04-14 NOTE — TELEPHONE ENCOUNTER
Patient Call: Transplant Lab/Orders  Route to LPN  Post Transplant Days: 1435  When patient is less than 60 days post-transplant, route high priority    Reason for Call: Discuss lab results; which results? Tacro  Callback needed? Yes    Return Call Needed  Same as documented in contacts section  When to return call?: Greater than one day: Route standard priority

## 2020-04-15 ENCOUNTER — TELEPHONE (OUTPATIENT)
Dept: TRANSPLANT | Facility: CLINIC | Age: 70
End: 2020-04-15

## 2020-04-15 NOTE — TELEPHONE ENCOUNTER
Patient Call: General  Route to LPN    Reason for call: Pt called for her Tacro level results  None in system  I called [facility]  for the results and they said it was not drawn  Looked at orders they had and was not on orders  Can you re fax orders to include drug level   See if they can use the sample from 4/10/2020    Call back needed? No

## 2020-04-15 NOTE — TELEPHONE ENCOUNTER
Returned Joselyn's call. No answer, left a message to call me back when she is available. Main office number provided.

## 2020-04-17 ENCOUNTER — TELEPHONE (OUTPATIENT)
Dept: TRANSPLANT | Facility: CLINIC | Age: 70
End: 2020-04-17

## 2020-04-17 NOTE — TELEPHONE ENCOUNTER
Pt calls to state that [facility] doesn't not have a lab order that reflects having a tacro drawn. Informed pt that I made a mistake and new lab order was faxed to [facility].

## 2020-04-20 ENCOUNTER — TELEPHONE (OUTPATIENT)
Dept: TRANSPLANT | Facility: CLINIC | Age: 70
End: 2020-04-20

## 2020-04-20 NOTE — TELEPHONE ENCOUNTER
4/15/2020 labs did not include a TAC level so Joselyn had a TAC level drawn on 4/17/2020 but this was only a 5 hour trough level according to the information provided in the results.     Attempted to reach Joselyn to confirm her Tacrolimus dose was taken prior to her 4/17/2020 afternoon lab draw. If so, no adjustments to be made at this time and we will have to redraw an accurate 12 hour trough level.     Message left for Joselyn to call me back when she is available. Main office number provided.     *UPDATE*  Joselyn returned my call. She said she did notify the lab that she had already taken her AM dose of Tacrolimus prior to her lab draw but the lab told her it would be OK.  We reviewed what an accurate 12 hour trough is and that she will need a morning appt for her lab draws. Joselyn will schedule a lab appt for Friday 4/24/2020 and we will fax an order for a TAC level.

## 2020-04-28 ENCOUNTER — TELEPHONE (OUTPATIENT)
Dept: TRANSPLANT | Facility: CLINIC | Age: 70
End: 2020-04-28

## 2020-04-28 NOTE — TELEPHONE ENCOUNTER
Joselyn was calling to review her most recent TAC level. TAC level is within goal range of 3-5 per protocol and Joselyn will continue her current Tacrolimus dose.    Joselyn does not have access to mychart and said she has contacted the help desk/IT and is not interested in getting set up again. She is wondering how she is expected to receive lab results now if her coordinator does not call her with them each time. I informed Joselyn that she can either set up her mychart for lab results or call the main Transplant Office line for lab results. She will call in to get her lab results and was appreciative for the information.

## 2020-05-14 ENCOUNTER — TELEPHONE (OUTPATIENT)
Dept: TRANSPLANT | Facility: CLINIC | Age: 70
End: 2020-05-14

## 2020-05-14 NOTE — TELEPHONE ENCOUNTER
Returned Joselyn's call. No answer, left v/m to call me back when she can and main office number provided.

## 2020-08-12 ENCOUNTER — TELEPHONE (OUTPATIENT)
Dept: TRANSPLANT | Facility: CLINIC | Age: 70
End: 2020-08-12

## 2020-08-12 NOTE — TELEPHONE ENCOUNTER
Patient Call: Voicemail  Date/Time: 8/12/20 1:58PM   Reason for call: Pt calling to find out what their tacrolimus level is and if they need to adjust their Rx. Has not yet heard back from Elsi (coordinator). Please call back ASAP.

## 2020-08-12 NOTE — TELEPHONE ENCOUNTER
Returned Joselyn's call. Joselyn was calling to get her TAC level and find out what her dose should be. She was very upset that I had not called her to discuss her labs. I informed Joselyn that her labs have been released via [employer] and that I usually don't call to discuss labs that WNL or expected but that I would definitely call to discuss abnormal results or any medication changes. Joselyn said her [employer] is not working. I offered to help her with that and she refused stating she wants a call notifying her of every lab result. I informed Joselyn that I would use [employer] or she could call in for results but that I would not be calling with every lab result. Joselyn was very angry and questioned why I would call with abnormal labs but not every lab per her request.     Joselyn also said that she thinks we need to discuss blood glucose with our transplant patients. She stated her blood glucose increased post transplant and no one every mentioned it to her and now she is type 2 diabetic requiring medication. She was very angry that this was not discussed with her (according to her recollection). I explained to Joselyn that we are her liver transplant team and would recommend she follow with her PCP for non liver related labs or issues.     According to note by Nandini Gonzalez, JOSECC (5/9/2019) it was recommended Joselyn follow up with her PCP regarding her labs and blood glucose. Joselyn stated at that time that she would do that. Joselyn called and left a message for me on 5/14/2020 stating she wanted to discuss glucose. I attempted to reach her but there was no answer and I left a message with no return call.     Joselyn has not follows up with Dr. Mata since 2017 and declined an appt stating she follows in [place]. I asked Joselyn if there was anything I could do at this time to help with her post transplant care. Ultimately Joselyn hung up on me.     Encounter forwarded to  Romana Gallego to possibly follow up with  Ren

## 2020-08-12 NOTE — TELEPHONE ENCOUNTER
Patient Call: General    Reason for call: Joselyn called wanting to know why Elsi has not called her back. This is the second she has left a message. Joselyn wants to know about her ?    Call back needed? Yes    Return Call Needed  Same as documented in contacts section  When to return call?: Same day: [facility] [employer]

## 2020-08-12 NOTE — TELEPHONE ENCOUNTER
Joselyn's first message to me was received at 2:30 pm. She called again at 3:40 pm wanting to know why I had not called her back yet.     Returned Joselyn's call at 4:02 pm. See other encounter for 8/12/2020.

## 2020-08-19 ENCOUNTER — TELEPHONE (OUTPATIENT)
Dept: TRANSPLANT | Facility: CLINIC | Age: 70
End: 2020-08-19

## 2020-08-21 NOTE — TELEPHONE ENCOUNTER
This writer called Joselyn and left a voice message requesting a call back. Message indicated that outpatient liver transplant SW would like to check in and provide additional support/resources if needed.     ABIGAIL Tavares, [facility]  Liver Transplant   [facility]  Phone: [phone]  Pager: [phone]

## 2020-10-22 ENCOUNTER — TELEPHONE (OUTPATIENT)
Dept: TRANSPLANT | Facility: CLINIC | Age: 70
End: 2020-10-22

## 2020-10-22 NOTE — TELEPHONE ENCOUNTER
Annual chart review completed. Joselyn is due to a dermatology visit. [employer] message sent to Joselyn to notify her.

## 2020-11-11 ENCOUNTER — TELEPHONE (OUTPATIENT)
Dept: TRANSPLANT | Facility: CLINIC | Age: 70
End: 2020-11-11

## 2020-11-11 NOTE — TELEPHONE ENCOUNTER
Joselyn called to review her TAC level. She was concerned that it was only 2.8. I reviewed with Joselyn that her goal is 3-5 and her level was a 13 hour trough which means that her 12 hour level would be slightly higher than 2.8. Joselyn's liver tests are stable and WNL and I mentioned she could have her TAC level redrawn if she is concerned but that everything looks good. Joselyn declined rechecking her TAC level and will get labs in 2-3 months per schedule.

## 2020-11-16 ENCOUNTER — HEALTH MAINTENANCE LETTER (OUTPATIENT)
Age: 70
End: 2020-11-16

## 2020-11-23 ENCOUNTER — TRANSFERRED RECORDS (OUTPATIENT)
Dept: HEALTH INFORMATION MANAGEMENT | Facility: CLINIC | Age: 70
End: 2020-11-23

## 2020-12-24 ENCOUNTER — TELEPHONE (OUTPATIENT)
Dept: TRANSPLANT | Facility: CLINIC | Age: 70
End: 2020-12-24

## 2020-12-24 NOTE — TELEPHONE ENCOUNTER
Patient Call: Medication Refill  Route to LPN  Instruct the patient to first contact their pharmacy. If they have called their pharmacy and require further assistance, route to LPN.      tacrolimus (GENERIC EQUIVALENT) 1 MG capsule    [id number] - [place], [place] - [address] AT [address] Phone:  [phone]   Fax:  [phone]          When will the patient be out of this medication?: Less than 24 hours ([facility] LPN, then page if no answer)

## 2020-12-28 ENCOUNTER — TELEPHONE (OUTPATIENT)
Dept: TRANSPLANT | Facility: CLINIC | Age: 70
End: 2020-12-28

## 2020-12-28 DIAGNOSIS — Z94.4 LIVER REPLACED BY TRANSPLANT (H): ICD-10-CM

## 2020-12-28 DIAGNOSIS — Z94.4 LIVER REPLACED BY TRANSPLANT (H): Primary | ICD-10-CM

## 2020-12-28 DIAGNOSIS — Z92.25 HISTORY OF IMMUNOSUPPRESSION THERAPY: ICD-10-CM

## 2020-12-28 RX ORDER — TACROLIMUS 1 MG/1
CAPSULE ORAL
Qty: 90 CAPSULE | Refills: 11 | Status: SHIPPED | OUTPATIENT
Start: 2020-12-28 | End: 2020-12-28

## 2020-12-28 RX ORDER — TACROLIMUS 1 MG/1
CAPSULE ORAL
Qty: 270 CAPSULE | Refills: 3 | Status: SHIPPED | OUTPATIENT
Start: 2020-12-28 | End: 2022-03-15

## 2020-12-28 RX ORDER — TACROLIMUS 1 MG/1
CAPSULE ORAL
Qty: 90 CAPSULE | Refills: 11 | Status: SHIPPED | OUTPATIENT
Start: 2020-12-28 | End: 2020-12-28 | Stop reason: DRUGHIGH

## 2020-12-28 RX ORDER — TACROLIMUS 1 MG/1
CAPSULE ORAL
Qty: 270 CAPSULE | Refills: 3 | COMMUNITY
Start: 2020-12-28 | End: 2021-12-03

## 2021-02-11 DIAGNOSIS — Z94.4 LIVER REPLACED BY TRANSPLANT (H): ICD-10-CM

## 2021-02-11 DIAGNOSIS — Z13.220 LIPID SCREENING: ICD-10-CM

## 2021-02-11 LAB
TACROLIMUS LAST DOSE (EXTERNAL): NORMAL
TACROLIMUS(FK-506) (EXTERNAL): 3.9 NG/ML

## 2021-03-26 DIAGNOSIS — Z94.4 LIVER REPLACED BY TRANSPLANT (H): ICD-10-CM

## 2021-03-26 DIAGNOSIS — Z13.220 LIPID SCREENING: ICD-10-CM

## 2021-04-04 ENCOUNTER — HEALTH MAINTENANCE LETTER (OUTPATIENT)
Age: 71
End: 2021-04-04

## 2021-09-18 ENCOUNTER — HEALTH MAINTENANCE LETTER (OUTPATIENT)
Age: 71
End: 2021-09-18

## 2021-11-18 ENCOUNTER — TELEPHONE (OUTPATIENT)
Dept: TRANSPLANT | Facility: CLINIC | Age: 71
End: 2021-11-18
Payer: COMMERCIAL

## 2021-11-18 NOTE — TELEPHONE ENCOUNTER
Annual chart review completed. Joselyn has not seen Dr. Mata since 2017 and is followed by another provider in [place]. Message sent to Dr. Mata to ask if he will continue to authorize RX refills for transplant medications. Joselyn does get labs routinely and we do receive results. She is also due for a dermatology visit. [employer] message sent to Joselyn to ask if she has done this recently and if not, that she is due.

## 2021-12-03 ENCOUNTER — TELEPHONE (OUTPATIENT)
Dept: TRANSPLANT | Facility: CLINIC | Age: 71
End: 2021-12-03
Payer: COMMERCIAL

## 2021-12-03 DIAGNOSIS — Z94.4 LIVER REPLACED BY TRANSPLANT (H): ICD-10-CM

## 2021-12-03 RX ORDER — TACROLIMUS 1 MG/1
CAPSULE ORAL
Qty: 270 CAPSULE | Refills: 0 | Status: SHIPPED | OUTPATIENT
Start: 2021-12-03 | End: 2024-08-12

## 2021-12-03 RX ORDER — TACROLIMUS 1 MG/1
CAPSULE ORAL
Qty: 270 CAPSULE | Refills: 0 | Status: CANCELLED | OUTPATIENT
Start: 2021-12-03

## 2021-12-03 NOTE — TELEPHONE ENCOUNTER
----- Message from Elsi Sidhu RN sent at 12/2/2021  4:54 PM CST -----  Regarding: FW: follow up  Lashawn    Can you please call Joselyn and let her know that Dr. Mata needs to see her once a year to continue to follow her and refill  medications.     Thank you,   Elsi   ----- Message -----  From: Elsi Sidhu RN  Sent: 11/22/2021   7:00 AM CST  To: Elsi Sidhu RN  Subject: FW: follow up                                    Call Joselyn and let her know she needs a visit within the next year with Dr. Mata   ----- Message -----  From: Chuck Mata MD  Sent: 11/19/2021   7:46 AM CST  To: Elsi Sidhu RN  Subject: RE: follow up                                    We can fill the meds but she has to see us in the next year.  ----- Message -----  From: Elsi Sidhu RN  Sent: 11/18/2021  12:08 PM CST  To: Chuck Mata MD  Subject: follow up                                        Joselyn is followed by provider in [place], hasn't seen you since 2017. Do you want to continue to fill her meds or do you prefer she transfer to her new provider?

## 2021-12-03 NOTE — TELEPHONE ENCOUNTER
"Spoke to pt and stated that Dr. Mata will fill the tacro script, however pt is over due to be seen. The last visit documented was 2017. Pt states that she has been following with her PCP. Questioned if pt would like the PCP to take over fillings the tacro. Pt states \" this is a life or death medication and if we are not on top of this med refill I will die and then my  will carlos the [facility]. Informed pt that writer can send a refill for a 3 month supply to [facility], and have a  reach out to schedule a virtual vist with Dr. Mata. Also suggested that if pt has not heard back from scheduling, please call our [phone], option 4 to schedule her virtual visit with Dr. Mata. Pt agreed.   "

## 2021-12-27 ENCOUNTER — TELEPHONE (OUTPATIENT)
Dept: TRANSPLANT | Facility: CLINIC | Age: 71
End: 2021-12-27
Payer: COMMERCIAL

## 2021-12-27 DIAGNOSIS — Z94.4 LIVER REPLACED BY TRANSPLANT (H): Primary | ICD-10-CM

## 2021-12-27 NOTE — TELEPHONE ENCOUNTER
Patient Call: Voicemail  Date/Time: 12/27 4pm  Reason for call: Patient left a voicemail requesting a call back from Elsi, would like to set up a virtual appt with Dr Mata.

## 2021-12-28 NOTE — TELEPHONE ENCOUNTER
"Returned Joselyn's call. She wanted to know how to get scheduled for a visit with Dr. Mata. She hasn't been seen since 2017 and I sent her a message via [employer] notifying her of this. Per Dr. Mata, we will need to see Joselyn this year. I informed Joselyn of this and her response was \"oh, so you're going to let me die by not refilling my medications\". I tried to discuss the importance of follow up with her hepatologist who is filling/managing her IS medications and she said she is followed by a provider in [place]. I again, informed her that we can do a telephone or video visit and that Dr. Mata would like to see her since it's been so long. At one point she asked me \"are you stupid\" to which I respectfully asked her not to speak to me that way, that I am trying to assist in setting up an appt and if there or no further respectful questions we are done for today and she will hear from scheduling regarding appt details. Joselyn verbalized understanding.     She was seeing Dr. Decker in [place] for liver transplant care but stopped because \"she had an attitude\".   "

## 2022-03-15 DIAGNOSIS — Z94.4 LIVER REPLACED BY TRANSPLANT (H): ICD-10-CM

## 2022-03-15 RX ORDER — TACROLIMUS 1 MG/1
CAPSULE ORAL
Qty: 270 CAPSULE | Refills: 3 | Status: SHIPPED | OUTPATIENT
Start: 2022-03-15 | End: 2023-01-10

## 2022-03-17 ENCOUNTER — VIRTUAL VISIT (OUTPATIENT)
Dept: GASTROENTEROLOGY | Facility: CLINIC | Age: 72
End: 2022-03-17
Attending: INTERNAL MEDICINE
Payer: COMMERCIAL

## 2022-03-17 DIAGNOSIS — Z94.4 LIVER REPLACED BY TRANSPLANT (H): Primary | ICD-10-CM

## 2022-03-17 PROCEDURE — 99203 OFFICE O/P NEW LOW 30 MIN: CPT | Mod: 95 | Performed by: INTERNAL MEDICINE

## 2022-03-17 NOTE — PROGRESS NOTES
"Joselyn is a 72 year old who is being evaluated via a billable telephone visit.  [phone]    Joselyn is a 72 year old who is being evaluated via a billable telephone visit.      What phone number would you like to be contacted at? [phone]  How would you like to obtain your AVS? By mail    Subjective   Joselyn is a 72 year old who presents for the following health issues: S/P liver transplantation    HPI:  I had the pleasure of seeing Joselyn Leigh for followup in the Liver Transplantation Clinic at the [facility] on March 17, 2022.  Ms. Leigh returns now almost 6 years status post liver transplantation for cirrhosis caused by nonalcoholic fatty liver disease.  It was also complicated by hepatocellular carcinoma.      For the most part, she is doing fairly well.  She denies any abdominal pain.  She denies any itching or skin rash.  She denies fatigue and is exercising on a daily basis.      She denies any fevers or chills, cough or shortness of breath.  She denies any nausea or vomiting.  Her appetite has been good and her weight has remained relatively stable.      She has received 3 doses of the COVID-19 vaccine.    Objective      Vitals:  No vitals were obtained today due to virtual visit.    Physical Exam: GENERAL APPEARANCE:\"healthy\",\"alert\",\"no distress. PSYCH: Alert and oriented times 3; coherent speech, normal  rate and volume, able to articulate logical thoughts, able  to abstract reason, no tangential thoughts, no hallucinations or delusions  Her affect is appropriate. RESP: No cough, no audible wheezing, able to talk in full sentences. Remainder of exam unable to be completed due to telephone visits.    Her most recent laboratory tests are from February 10 and show her white count is 4.4, hemoglobin is 14.1 and platelets are 208,000.  Her sodium is 138, potassium 4.7, BUN is 27 and creatinine is 0.83.  Her AST is 19, ALT is 14 and alkaline phosphatase is 92.  Her albumin " is 4.3 with a total protein of 7.8 and total bilirubin is 0.6.  Her tacrolimus level is 3.3.    My impression is that Ms. Leigh is almost 6 years status post liver transplantation and is really doing quite well.  She does need a follow-up DEXA scan and we also recommended that her transplant patients get a fourth dose of the COVID-19 vaccine.  She is also due for a second Pneumovax vaccine.  She is otherwise up-to-date with regard to other vaccines and cancer screening.  She does need to see us once a year in order for us to continue to renew her medications.     Thank you very much for allowing me to participate in the care of this patient.  If you have any questions regarding my recommendations, please do not hesitate to contact me.         Chuck Mata MD      Professor of Medicine  [employer]      Executive Medical Director, Solid Organ Transplant Program  [facility]     Phone call duration: 20 minutes with 10 minutes for documentation.

## 2022-04-30 ENCOUNTER — HEALTH MAINTENANCE LETTER (OUTPATIENT)
Age: 72
End: 2022-04-30

## 2022-05-10 ENCOUNTER — TELEPHONE (OUTPATIENT)
Dept: TRANSPLANT | Facility: CLINIC | Age: 72
End: 2022-05-10
Payer: COMMERCIAL

## 2022-05-10 DIAGNOSIS — Z94.4 LIVER REPLACED BY TRANSPLANT (H): ICD-10-CM

## 2022-05-10 DIAGNOSIS — Z13.220 LIPID SCREENING: ICD-10-CM

## 2022-05-10 NOTE — TELEPHONE ENCOUNTER
Voicemail  Date/Time: 5/10/22  Reason for call Patient had labs drawn today at [facility] and staff she needs Tacrolimus drawn, the order  in march lab need a new one faxed to them. They already kia for this lab just need updated order to run it. New order can be faxed to [phone]

## 2022-05-10 NOTE — LETTER
OUTPATIENT OR TRANSITIONAL CARE  LABORATORY TEST ORDER  [facility]  Fax# [phone]     Patient Name:    Joselyn Leigh                                      Transplant Date:   5/10/2016   YOB: 1950                                             Issue Date:            05/10/2022  [facility] MR#:    [id number]                                           Expiration Date:   05/10/2023         Diagnoses:            [x]??      Liver Transplant (ICD-10 Z94.4)                                 [x]??      Long term use of medications (ICD-10 Z79.899)      Please fax results to [phone]     Frequency: every 2-3 months and PRN        [x]??         CBC with Platelets   [x]??         Basic Metabolic Panel (Sodium, Potassium, Chloride, CO2, Creatinine, Urea Nitrogen, Glucose, Calcium)  [x]??         Hepatic panel (Albumin, Alk Phos, ALT, AST, Direct and Total Bili)  [x]??         Tacrolimus drug level - 12 hour trough, please document time of last dose      Other Frequency: annually due May 2022  [x]??         Fasting lipid panel  [x]??         Urine protein/creatinine ratio  [x]??         Urinalysis with reflex to micro    If you have questions, please call [phone] or [phone].    .

## 2022-05-18 ENCOUNTER — TELEPHONE (OUTPATIENT)
Dept: TRANSPLANT | Facility: CLINIC | Age: 72
End: 2022-05-18
Payer: COMMERCIAL

## 2022-05-18 NOTE — TELEPHONE ENCOUNTER
Patient Call: General  Route to LPN    Reason for call: patient wanted to call and let Lashawn WHATLEY that current fax number is viable and that another one is [phone]    Call back needed? No

## 2022-05-18 NOTE — TELEPHONE ENCOUNTER
General    Reason for call: Patient states when she had her appointment in [place] yesterday they informed her that they were unable to draw Tacro lab from 05/10 since they only had an  order at the time of the lab. Patient is frustrated and is under the impression since we didn't send order in time she won't be going back to have labs drawn and will return in August. Patient also mentioned we changed our platform system and now she can't see lab results. Writer offered mychart tech line number    Call back needed? Yes  Return Call Needed  Same as documented in contacts section  When to return call?: Same day: [facility]

## 2022-05-18 NOTE — LETTER
OUTPATIENT OR TRANSITIONAL CARE  LABORATORY TEST ORDER  [facility]  Fax# [phone]     Patient Name:    Joselyn Leigh                                      Transplant Date:   5/10/2016   YOB: 1950                                             Issue Date:            05/18/2022  [facility] MR#:    [id number]                                           Expiration Date:   05/10/2023         Diagnoses:            [x]????      Liver Transplant (ICD-10 Z94.4)                                 [x]????      Long term use of medications (ICD-10 Z79.899)      Please fax results to [phone]     Frequency: every 2-3 months and PRN        [x]????         CBC with Platelets   [x]????         Basic Metabolic Panel (Sodium, Potassium, Chloride, CO2, Creatinine, Urea Nitrogen, Glucose, Calcium)  [x]????         Hepatic panel (Albumin, Alk Phos, ALT, AST, Direct and Total Bili)  [x]????         Tacrolimus drug level - 12 hour trough, please document time of last dose      Other Frequency: annually due May 2022  [x]????         Fasting lipid panel  [x]????         Urine protein/creatinine ratio  [x]????         Urinalysis with reflex to micro    If you have questions, please call [phone] or [phone].    .

## 2022-05-18 NOTE — TELEPHONE ENCOUNTER
Spoke to pt and requested she call [facility] and obtain a different fax number as writer has faxed the lab letter twice this month.

## 2022-05-19 ENCOUNTER — TELEPHONE (OUTPATIENT)
Dept: TRANSPLANT | Facility: CLINIC | Age: 72
End: 2022-05-19
Payer: COMMERCIAL

## 2022-05-19 NOTE — TELEPHONE ENCOUNTER
Pt calls to state that she never received a call back from yesterday when she called in the new fax number to fax lab letter. Explained to pt that the message stated that a call back was not needed. Pt states that clinic did not get the lab letter. Writer did fax the lab letter to the new number. Suggested that pt take the lab letter that was mailed to her from 5/10 and bring it into the [facility] to copy and have on file. Pt agreed.

## 2022-08-15 ENCOUNTER — TELEPHONE (OUTPATIENT)
Dept: TRANSPLANT | Facility: CLINIC | Age: 72
End: 2022-08-15

## 2022-08-15 NOTE — TELEPHONE ENCOUNTER
Returned Joselyn's call. She was calling to review her Aug lab results. No lab results received in our system, available in care everywhere. Joselyn's labs are stable. TAC level is within goal. I asked Joselyn to please ask her lab to fax to us and I provided our fax number as well.

## 2022-08-16 ENCOUNTER — TELEPHONE (OUTPATIENT)
Dept: TRANSPLANT | Facility: CLINIC | Age: 72
End: 2022-08-16

## 2022-08-16 NOTE — TELEPHONE ENCOUNTER
Joselyn called to check and see if we received lab results that she requested be faxed to us from her local lab. I informed Joselyn that it will take a couple of days to come to me and to get scanned into her chart here. She will call me back Thursday to check if received yet.

## 2022-08-22 ENCOUNTER — TELEPHONE (OUTPATIENT)
Dept: TRANSPLANT | Facility: CLINIC | Age: 72
End: 2022-08-22

## 2022-08-22 NOTE — TELEPHONE ENCOUNTER
Called Joselyn to review her 8/10/22 labs as she did not review the [employer] message or result message sent to her. [facility] liver tests look good. Lipid panel was slightly abnormal and Dr. Mata recommends she start a statin. I verified this was a fasting lab and informed Joselyn of Dr. Zepeda's recommendation. She said she was  on Pravastatin but stopped due to leg cramps. She said she went back on Pravastatin on 8/11/2022 after reviewing her recent lipid panel.     I recommend she repeat her liver function tests in a week or so. Joselyn verbalized understanding and has been in contact with her PCP who is managing her cholesterol/statin.

## 2022-09-30 ENCOUNTER — TELEPHONE (OUTPATIENT)
Dept: TRANSPLANT | Facility: CLINIC | Age: 72
End: 2022-09-30

## 2022-09-30 NOTE — LETTER
Joselyn Leigh  [address] [address]                September 30, 2022    Pardeep Alves,     You are due for annual urine tests that include;    - urinalysis  - urine protein    You have orders and will just need to remember to provide a urine sample at your next lab visit. You are also due for an annual dermatology visit for a generalized skin check.  Please call me with any questions/concerns.     Thank you,   Elsi Sidhu, RN, BSN, CCTN  Post Liver Transplant Coordinator

## 2022-09-30 NOTE — TELEPHONE ENCOUNTER
Annual chart review completed and Joselyn is due for the following;    - Annual urinalysis and urine protein.  - Annual dermatology visit for a generalized skin check.     Letter sent to Joselyn to notify her.

## 2022-11-20 ENCOUNTER — HEALTH MAINTENANCE LETTER (OUTPATIENT)
Age: 72
End: 2022-11-20

## 2023-01-10 DIAGNOSIS — Z94.4 LIVER REPLACED BY TRANSPLANT (H): ICD-10-CM

## 2023-01-10 RX ORDER — TACROLIMUS 1 MG/1
CAPSULE ORAL
Qty: 270 CAPSULE | Refills: 3 | Status: SHIPPED | OUTPATIENT
Start: 2023-01-10 | End: 2024-03-05

## 2023-05-11 ENCOUNTER — TELEPHONE (OUTPATIENT)
Dept: TRANSPLANT | Facility: CLINIC | Age: 73
End: 2023-05-11
Payer: COMMERCIAL

## 2023-05-11 DIAGNOSIS — Z94.4 LIVER REPLACED BY TRANSPLANT (H): Primary | ICD-10-CM

## 2023-05-11 NOTE — LETTER
OUTPATIENT LABORATORY TEST ORDER   [facility]  Fax# [phone]    Patient Name: Joselyn Leigh   YOB: 1950     [facility] MR# [if applicable]: [id number]   Date & Time: May 11, 2023  3:05 PM  Expiration Date: 1 year after date issued     Diagnosis: Liver Transplant (ICD-10 Z94.4)   Aftercare following organ transplant (ICD-10 Z48.288)   Long term use of medications (ICD-10 Z79.899)      We ask your assistance in obtaining the following laboratory tests, which are part of our routine surveillance program for Solid Organ Transplant patients.     Please fax each result to [phone], same day as resulted/available    Critical lab results page [phone]  Monday - Friday 8 am to 5 pm  Evening/Weekend/Holiday communicate Critical labs results [phone]    >Every 6-months post-transplant  Magnesium    >1-year post-transplant  Every 2-3 months  CBC with Platelets   Basic Metabolic Panel   Hepatic panel   Tacrolimus drug level - 12 hour trough, please document time of last dose     >1-year post-transplant  Labs annually due July 20223  Fasting Lipid Panel  Urine protein/creatinine ratio  UA with reflex to micro  Phosphorous    If you have any questions, please call [facility]- [phone] or [phone], Fax- [phone].    .

## 2023-05-11 NOTE — TELEPHONE ENCOUNTER
Returned patient phone call- patient needs new lab orders for BMP, CBC, LFTs and Tac level faxed over to her lab.     Will send request to LPN for .

## 2023-05-26 ENCOUNTER — OFFICE VISIT (OUTPATIENT)
Dept: GASTROENTEROLOGY | Facility: CLINIC | Age: 73
End: 2023-05-26
Attending: INTERNAL MEDICINE
Payer: COMMERCIAL

## 2023-05-26 VITALS
DIASTOLIC BLOOD PRESSURE: 86 MMHG | BODY MASS INDEX: 27.74 KG/M2 | TEMPERATURE: 97.6 F | OXYGEN SATURATION: 94 % | HEART RATE: 79 BPM | SYSTOLIC BLOOD PRESSURE: 142 MMHG | WEIGHT: 165.4 LBS

## 2023-05-26 DIAGNOSIS — Z94.4 LIVER REPLACED BY TRANSPLANT (H): Primary | ICD-10-CM

## 2023-05-26 PROCEDURE — 99214 OFFICE O/P EST MOD 30 MIN: CPT | Performed by: INTERNAL MEDICINE

## 2023-05-26 PROCEDURE — G0463 HOSPITAL OUTPT CLINIC VISIT: HCPCS | Performed by: INTERNAL MEDICINE

## 2023-05-26 ASSESSMENT — PAIN SCALES - GENERAL: PAINLEVEL: NO PAIN (0)

## 2023-05-26 NOTE — LETTER
5/26/2023         RE: Joselyn Leigh  [address] [address]        Dear Colleague,    Thank you for referring your patient, Joselyn Leigh, to the [facility] [place]. Please see a copy of my visit note below.    HISTORY OF PRESENT ILLNESS:  I had the pleasure of seeing Jsoelyn Leigh for followup in the Liver Transplant Clinic at the [facility] on 05/26/2023.  Ms. Leigh returns for followup now more than 7 years status post liver transplantation for cirrhosis caused by nonalcoholic fatty liver disease and complicated by hepatocellular carcinoma.    She is doing very well.  She denies any abdominal pain, itching or skin rash or fatigue.  She does complain of increased abdominal girth.  It seems to reflect the fact that she has very poor abdominal tone.  She denies any gastrointestinal bleeding.    She denies any fevers or chills, cough or shortness of breath.  She denies any nausea or vomiting, diarrhea or constipation.  Her appetite has been good and her weight has come down a bit and she has maintained it at a lower level.    She also has diabetes.  Her hemoglobin A1c has been under good control.    Current Outpatient Medications   Medication    calcium-vitamin D (CALTRATE) 600-400 MG-UNIT per tablet    losartan (COZAAR) 100 MG tablet    Misc Natural Products (OSTEO BI-FLEX ADV DOUBLE ST PO)    multivitamin, therapeutic with minerals (MULTI-VITAMIN) TABS    omeprazole (PRILOSEC) 20 MG capsule    tacrolimus (GENERIC EQUIVALENT) 1 MG capsule    tacrolimus (GENERIC EQUIVALENT) 1 MG capsule     No current facility-administered medications for this visit.     BP (!) 142/86   Pulse 79   Temp 97.6  F (36.4  C) (Oral)   Wt 75 kg (165 lb 6.4 oz)   SpO2 94%   BMI 27.74 kg/m      PHYSICAL EXAMINATION:    GENERAL:  She looks quite well.  HEENT EXAMINATION:  Shows no scleral icterus or temporal muscle wasting.  CHEST:  Clear.  ABDOMINAL  EXAMINATION:  Shows no increase in girth.  No masses or tenderness to palpation are present.  Her liver is 10 cm in span without left lobe enlargement.  No spleen tip is palpable.  Her incision is intact.  EXTREMITY EXAMINATION:  Shows no edema.  SKIN EXAMINATION:  Shows no suspicious lesions.  NEUROLOGIC EXAMINATION:  Nonfocal.    LABORATORY DATA:  Her most recent laboratory tests show her white count is 4.1, hemoglobin is 14.8 and platelets are 207,000.  Her sodium is 140, potassium 3.8, BUN is 25, creatinine 0.86.  Her AST is 16, ALT is 15 and alkaline phosphatase 92.  Her albumin is 4.3 with total protein of 7.4.    IMPRESSION:  Ms. Leigh is doing very well now 7 years status post liver transplantation.  She does not need any imaging of her liver, chest and pelvis anymore, as she is beyond 5 years and is cured from her HCC.  She will continue to get blood tests every 3 months.  I have gone over vaccinations.  She is going to get an additional shingles vaccine.  She is up to date with regard to colorectal cancer screening.  She does need to see a dermatologist once a year for skin cancer screening and my plan will be to see her back in the clinic again in 1 year.    I did spend a total of 30 minutes (on the date of the encounter), including 20 minutes of face-to-face clinic time including counseling. The rest of the time was spent in documentation and review of records.     Thank you very much for allowing me to participate in the care of this patient.  If you have any questions regarding recommendations, please do not hesitate to contact me.         Chuck Mata MD      Professor of Medicine  [employer]      Executive Medical Director, Solid Organ Transplant Program  [facility]      Again, thank you for allowing me to participate in the care of your patient.        Sincerely,        Chuck Mata MD

## 2023-05-26 NOTE — PROGRESS NOTES
HISTORY OF PRESENT ILLNESS:  I had the pleasure of seeing Joselyn Leigh for followup in the Liver Transplant Clinic at the [facility] on 05/26/2023.  Ms. Leigh returns for followup now more than 7 years status post liver transplantation for cirrhosis caused by nonalcoholic fatty liver disease and complicated by hepatocellular carcinoma.    She is doing very well.  She denies any abdominal pain, itching or skin rash or fatigue.  She does complain of increased abdominal girth.  It seems to reflect the fact that she has very poor abdominal tone.  She denies any gastrointestinal bleeding.    She denies any fevers or chills, cough or shortness of breath.  She denies any nausea or vomiting, diarrhea or constipation.  Her appetite has been good and her weight has come down a bit and she has maintained it at a lower level.    She also has diabetes.  Her hemoglobin A1c has been under good control.    Current Outpatient Medications   Medication     calcium-vitamin D (CALTRATE) 600-400 MG-UNIT per tablet     losartan (COZAAR) 100 MG tablet     Misc Natural Products (OSTEO BI-FLEX ADV DOUBLE ST PO)     multivitamin, therapeutic with minerals (MULTI-VITAMIN) TABS     omeprazole (PRILOSEC) 20 MG capsule     tacrolimus (GENERIC EQUIVALENT) 1 MG capsule     tacrolimus (GENERIC EQUIVALENT) 1 MG capsule     No current facility-administered medications for this visit.     BP (!) 142/86   Pulse 79   Temp 97.6  F (36.4  C) (Oral)   Wt 75 kg (165 lb 6.4 oz)   SpO2 94%   BMI 27.74 kg/m      PHYSICAL EXAMINATION:    GENERAL:  She looks quite well.  HEENT EXAMINATION:  Shows no scleral icterus or temporal muscle wasting.  CHEST:  Clear.  ABDOMINAL EXAMINATION:  Shows no increase in girth.  No masses or tenderness to palpation are present.  Her liver is 10 cm in span without left lobe enlargement.  No spleen tip is palpable.  Her incision is intact.  EXTREMITY EXAMINATION:  Shows no edema.  SKIN EXAMINATION:   Shows no suspicious lesions.  NEUROLOGIC EXAMINATION:  Nonfocal.    LABORATORY DATA:  Her most recent laboratory tests show her white count is 4.1, hemoglobin is 14.8 and platelets are 207,000.  Her sodium is 140, potassium 3.8, BUN is 25, creatinine 0.86.  Her AST is 16, ALT is 15 and alkaline phosphatase 92.  Her albumin is 4.3 with total protein of 7.4.    IMPRESSION:  Ms. Leigh is doing very well now 7 years status post liver transplantation.  She does not need any imaging of her liver, chest and pelvis anymore, as she is beyond 5 years and is cured from her HCC.  She will continue to get blood tests every 3 months.  I have gone over vaccinations.  She is going to get an additional shingles vaccine.  She is up to date with regard to colorectal cancer screening.  She does need to see a dermatologist once a year for skin cancer screening and my plan will be to see her back in the clinic again in 1 year.    I did spend a total of 30 minutes (on the date of the encounter), including 20 minutes of face-to-face clinic time including counseling. The rest of the time was spent in documentation and review of records.     Thank you very much for allowing me to participate in the care of this patient.  If you have any questions regarding recommendations, please do not hesitate to contact me.         Chuck Mata MD      Professor of Medicine  [employer]      Executive Medical Director, Solid Organ Transplant Program  [facility]

## 2023-05-26 NOTE — NURSING NOTE
Chief Complaint   Patient presents with     RECHECK     Annual f/u       BP (!) 142/86   Pulse 79   Temp 97.6  F (36.4  C) (Oral)   Wt 75 kg (165 lb 6.4 oz)   SpO2 94%   BMI 27.74 kg/m      Rayo Kate on 5/26/2023 at 11:30 AM

## 2023-06-01 ENCOUNTER — HEALTH MAINTENANCE LETTER (OUTPATIENT)
Age: 73
End: 2023-06-01

## 2023-11-06 ENCOUNTER — TELEPHONE (OUTPATIENT)
Dept: GASTROENTEROLOGY | Facility: CLINIC | Age: 73
End: 2023-11-06
Payer: COMMERCIAL

## 2023-11-06 NOTE — TELEPHONE ENCOUNTER
[facility] Dr[facility] Mata// 11.6.23 KET

## 2023-11-08 ENCOUNTER — TELEPHONE (OUTPATIENT)
Dept: GASTROENTEROLOGY | Facility: CLINIC | Age: 73
End: 2023-11-08
Payer: COMMERCIAL

## 2023-11-09 ENCOUNTER — TELEPHONE (OUTPATIENT)
Dept: TRANSPLANT | Facility: CLINIC | Age: 73
End: 2023-11-09
Payer: COMMERCIAL

## 2023-11-09 NOTE — LETTER
OUTPATIENT LABORATORY TEST ORDER   [facility]  Fax# [phone]     Patient Name: Joselyn Leigh      YOB: 1950        [facility] MR# [if applicable]: [id number]   Date & Time: November 9, 2023  Expiration Date: 1 year after date issued      Diagnosis:      Liver Transplant (ICD-10 Z94.4)   Aftercare following organ transplant (ICD-10 Z48.288)   Long term use of medications (ICD-10 Z79.899)      We ask your assistance in obtaining the following laboratory tests, which are part of our routine surveillance program for Solid Organ Transplant patients.      Please fax each result to [phone], same day as resulted/available    Critical lab results page [phone]  Monday - Friday 8 am to 5 pm  Evening/Weekend/Holiday communicate Critical labs results [phone]     >Every 6-months post-transplant  Magnesium with next lab draw     >1-year post-transplant  Every 2-3 months  CBC with Platelets   Basic Metabolic Panel   Hepatic panel   Tacrolimus drug level - 12 hour trough, please document time of last dose      >1-year post-transplant  Labs annually due November 2023  Fasting Lipid Panel  Urine protein/creatinine ratio  UA with reflex to micro  Phosphorous    If you have any questions, please call [facility]- [phone] or [phone], Fax- [phone].    .

## 2023-11-09 NOTE — TELEPHONE ENCOUNTER
[facility] Call Center    Phone Message    May a detailed message be left on voicemail: yes     Reason for Call: Other: Patient called and was frustrated that everytime she draw labs at her local clinic, they do not have the correct orders, such as the Tacrolimus. She has to notify them and they are saying they do not have the proper orders. Patient would like this to be corrected.      Action Taken: Message routed to:  [facility] LATRICE SOT    Travel Screening: Not Applicable

## 2023-11-09 NOTE — TELEPHONE ENCOUNTER
Chelsea calls from the lab to ask for a lab order. Informed her Chelsea one was faxed this past may. Chelsea asked another get faxed.

## 2023-11-09 NOTE — TELEPHONE ENCOUNTER
Spoke to pt and was frustrated with the their lab as they are not ordering the correct labs. Suggested per haps going to another lab if continues.

## 2023-11-22 DIAGNOSIS — Z94.4 LIVER TRANSPLANT RECIPIENT (H): Primary | ICD-10-CM

## 2024-02-13 ENCOUNTER — TELEPHONE (OUTPATIENT)
Dept: TRANSPLANT | Facility: CLINIC | Age: 74
End: 2024-02-13
Payer: COMMERCIAL

## 2024-02-13 NOTE — TELEPHONE ENCOUNTER
General  Route to N    Reason for call: Joselyn wants to go over her glucose results, she says they are high and needs to know what she should be doing. She said the only sugar that she consumes is fruit so she is concerned and would like to know what to do in order to lower it.    Call back needed? Yes    Return Call Needed  Same as documented in contacts section  When to return call?: Same day: Route High Priority

## 2024-03-05 DIAGNOSIS — Z94.4 LIVER REPLACED BY TRANSPLANT (H): ICD-10-CM

## 2024-03-05 RX ORDER — TACROLIMUS 1 MG/1
CAPSULE ORAL
Qty: 270 CAPSULE | Refills: 3 | Status: SHIPPED | OUTPATIENT
Start: 2024-03-05

## 2024-05-13 ENCOUNTER — OFFICE VISIT (OUTPATIENT)
Dept: GASTROENTEROLOGY | Facility: CLINIC | Age: 74
End: 2024-05-13
Attending: INTERNAL MEDICINE
Payer: COMMERCIAL

## 2024-05-13 VITALS
TEMPERATURE: 97.8 F | BODY MASS INDEX: 27.72 KG/M2 | SYSTOLIC BLOOD PRESSURE: 149 MMHG | HEART RATE: 87 BPM | OXYGEN SATURATION: 97 % | WEIGHT: 165.3 LBS | DIASTOLIC BLOOD PRESSURE: 92 MMHG

## 2024-05-13 DIAGNOSIS — Z94.4 LIVER REPLACED BY TRANSPLANT (H): Primary | ICD-10-CM

## 2024-05-13 PROCEDURE — 99214 OFFICE O/P EST MOD 30 MIN: CPT | Performed by: INTERNAL MEDICINE

## 2024-05-13 PROCEDURE — G0463 HOSPITAL OUTPT CLINIC VISIT: HCPCS | Performed by: INTERNAL MEDICINE

## 2024-05-13 ASSESSMENT — PAIN SCALES - GENERAL: PAINLEVEL: NO PAIN (0)

## 2024-05-13 NOTE — PROGRESS NOTES
Solid Organ Transplant Hepatology Follow-Up Visit:     HISTORY OF PRESENT ILLNESS:   I had the pleasure of seeing Joselyn Leigh for followup in the Liver Clinic at the [facility] on May 13, 2024. Ms. Leigh returns for followup now 8 years status post liver transplantation for cirrhosis caused by nonalcoholic fatty liver disease and complicated by hepatocellular carcinoma.     She is doing very well.  She denies any abdominal pain, itching or skin rash or fatigue.  She does complain of increased abdominal girth.  It seems to reflect the fact that she has very poor abdominal tone.  She denies any gastrointestinal bleeding.     She denies any fevers or chills, cough or shortness of breath.  She denies any nausea or vomiting, diarrhea or constipation.  Her appetite has been good and her weight has come down a bit and she has maintained it at a lower level.     She also has diabetes.  Her hemoglobin A1c has been OK but could be betterl.    Medications:   Current Outpatient Medications   Medication Sig Dispense Refill    calcium-vitamin D (CALTRATE) 600-400 MG-UNIT per tablet Take 1 tablet by mouth 2 times daily      losartan (COZAAR) 100 MG tablet TAKE ONE TABLET BY MOUTH EVERY DAY 30 tablet 0    metFORMIN (GLUCOPHAGE) 500 MG tablet Take 500 mg by mouth daily (with breakfast)      multivitamin, therapeutic with minerals (MULTI-VITAMIN) TABS Take 1 tablet by mouth daily prenatal      omeprazole (PRILOSEC) 20 MG capsule Take 20 mg by mouth daily      tacrolimus (GENERIC EQUIVALENT) 1 MG capsule Take 2 capsules (2 mg) by mouth every morning AND 1 capsule (1 mg) every evening. 270 capsule 0    Misc Natural Products (OSTEO BI-FLEX ADV DOUBLE ST PO) Take by mouth daily (Patient not taking: Reported on 5/13/2024)      tacrolimus (GENERIC) 1 MG capsule TAKE 2 CAPSULES BY MOUTH EVERY MORNING AND 1 CAPSULE BY MOUTH EVERY EVENING (Patient not taking: Reported on 5/13/2024) 270 capsule 3     No current  facility-administered medications for this visit.        Vitals:   BP (!) 149/92   Pulse 87   Temp 97.8  F (36.6  C) (Oral)   Wt 75 kg (165 lb 4.8 oz)   SpO2 97%   BMI 27.72 kg/m      Physical Exam:   In general she looks quite well. HEENT exam shows no scleral icterus or temporal muscle wasting. Chest is clear. Abdominal exam shows no increase in girth. No masses or tenderness to palpation are present. Liver is 10 cm in span without left lobe enlargement. No spleen tip is palpable. Her incision is intact.  Extremity exam shows no edema. Skin exam shows no suspicious lesions and neurologic exam is nonfocal.     Labs:   Lab Results   Component Value Date     08/01/2017    POTASSIUM 4.9 08/01/2017    CHLORIDE 102 05/10/2024    ANIONGAP 9 08/01/2017    CO2 27 08/01/2017    BUN 39 (H) 08/01/2017    CR 0.87 08/01/2017    GFRESTIMATED 65 08/01/2017    COBY 9.1 08/01/2017      Lab Results   Component Value Date    WBC 3.0 (L) 08/01/2017    HGB 12.3 08/01/2017    HCT 35.6 08/01/2017    MCV 86 08/01/2017    MCH 29.8 08/01/2017    MCHC 34.6 08/01/2017    RDW 13.7 08/01/2017     08/01/2017     Lab Results   Component Value Date    ALBUMIN 3.8 08/01/2017    ALKPHOS 107 08/01/2017    AST 7 08/01/2017     Lab Results   Component Value Date    INR 1.19 (H) 05/18/2016       Recent Labs   Lab Test 08/01/17  0915 07/11/17  1000 07/07/17  0913 06/01/17  0915 05/01/17  0853 04/03/17  0912 03/02/17  0846 02/02/17  1426 01/31/17  0854 12/27/16  0857   ALKPHOS 107 129 163* 128 130 118 111 136 125 116   ALT 25 78* 232* 20 35 24 20 20 18 18   AST 7 10 53* 11 13 14 12 11 13 14        Imaging:   No images are attached to the encounter.     Assessment/Plan:   IMPRESSION:   Ms. Leigh is doing very well now 8 years status post liver transplantation.  She is cured of her HCC.  She will continue to get blood tests every 3 months.  I have gone over vaccinations.  She is up to date with regard to colorectal cancer screening.  She  does need to see a dermatologist once a year for skin cancer screening and my plan will be to see her back in the clinic again in 1 year.     I did spend a total of 30 minutes (on the date of the encounter), including 20 minutes of face-to-face clinic time including counseling. The rest of the time was spent in documentation and review of records.    Thank you very much for allowing me to participate in the care of this patient.  If you have any questions regarding my recommendations, please do not hesitate to contact me.         Chuck Mata MD      Professor of Medicine  [employer]      Executive Medical Director, Solid Organ Transplant Program  [facility]

## 2024-05-13 NOTE — NURSING NOTE
Chief Complaint   Patient presents with    RECHECK       BP (!) 149/92   Pulse 87   Temp 97.8  F (36.6  C) (Oral)   Wt 75 kg (165 lb 4.8 oz)   SpO2 97%   BMI 27.72 kg/m      Rayo Kate on 5/13/2024 at 2:03 PM

## 2024-05-13 NOTE — LETTER
5/13/2024         RE: Joselyn Leigh  [address] [address]        Dear Colleague,    Thank you for referring your patient, Joselyn Leigh, to the [facility] [place]. Please see a copy of my visit note below.    Solid Organ Transplant Hepatology Follow-Up Visit:     HISTORY OF PRESENT ILLNESS:   I had the pleasure of seeing Joselyn Leigh for followup in the Liver Clinic at the [facility] on May 13, 2024. Ms. Leigh returns for followup now 8 years status post liver transplantation for cirrhosis caused by nonalcoholic fatty liver disease and complicated by hepatocellular carcinoma.     She is doing very well.  She denies any abdominal pain, itching or skin rash or fatigue.  She does complain of increased abdominal girth.  It seems to reflect the fact that she has very poor abdominal tone.  She denies any gastrointestinal bleeding.     She denies any fevers or chills, cough or shortness of breath.  She denies any nausea or vomiting, diarrhea or constipation.  Her appetite has been good and her weight has come down a bit and she has maintained it at a lower level.     She also has diabetes.  Her hemoglobin A1c has been OK but could be betterl.    Medications:   Current Outpatient Medications   Medication Sig Dispense Refill     calcium-vitamin D (CALTRATE) 600-400 MG-UNIT per tablet Take 1 tablet by mouth 2 times daily       losartan (COZAAR) 100 MG tablet TAKE ONE TABLET BY MOUTH EVERY DAY 30 tablet 0     metFORMIN (GLUCOPHAGE) 500 MG tablet Take 500 mg by mouth daily (with breakfast)       multivitamin, therapeutic with minerals (MULTI-VITAMIN) TABS Take 1 tablet by mouth daily prenatal       omeprazole (PRILOSEC) 20 MG capsule Take 20 mg by mouth daily       tacrolimus (GENERIC EQUIVALENT) 1 MG capsule Take 2 capsules (2 mg) by mouth every morning AND 1 capsule (1 mg) every evening. 270 capsule 0     Misc Natural Products (OSTEO BI-FLEX  ADV DOUBLE ST PO) Take by mouth daily (Patient not taking: Reported on 5/13/2024)       tacrolimus (GENERIC) 1 MG capsule TAKE 2 CAPSULES BY MOUTH EVERY MORNING AND 1 CAPSULE BY MOUTH EVERY EVENING (Patient not taking: Reported on 5/13/2024) 270 capsule 3     No current facility-administered medications for this visit.        Vitals:   BP (!) 149/92   Pulse 87   Temp 97.8  F (36.6  C) (Oral)   Wt 75 kg (165 lb 4.8 oz)   SpO2 97%   BMI 27.72 kg/m      Physical Exam:   In general she looks quite well. HEENT exam shows no scleral icterus or temporal muscle wasting. Chest is clear. Abdominal exam shows no increase in girth. No masses or tenderness to palpation are present. Liver is 10 cm in span without left lobe enlargement. No spleen tip is palpable. Her incision is intact.  Extremity exam shows no edema. Skin exam shows no suspicious lesions and neurologic exam is nonfocal.     Labs:   Lab Results   Component Value Date     08/01/2017    POTASSIUM 4.9 08/01/2017    CHLORIDE 102 05/10/2024    ANIONGAP 9 08/01/2017    CO2 27 08/01/2017    BUN 39 (H) 08/01/2017    CR 0.87 08/01/2017    GFRESTIMATED 65 08/01/2017    COBY 9.1 08/01/2017      Lab Results   Component Value Date    WBC 3.0 (L) 08/01/2017    HGB 12.3 08/01/2017    HCT 35.6 08/01/2017    MCV 86 08/01/2017    MCH 29.8 08/01/2017    MCHC 34.6 08/01/2017    RDW 13.7 08/01/2017     08/01/2017     Lab Results   Component Value Date    ALBUMIN 3.8 08/01/2017    ALKPHOS 107 08/01/2017    AST 7 08/01/2017     Lab Results   Component Value Date    INR 1.19 (H) 05/18/2016       Recent Labs   Lab Test 08/01/17  0915 07/11/17  1000 07/07/17  0913 06/01/17  0915 05/01/17  0853 04/03/17  0912 03/02/17  0846 02/02/17  1426 01/31/17  0854 12/27/16  0857   ALKPHOS 107 129 163* 128 130 118 111 136 125 116   ALT 25 78* 232* 20 35 24 20 20 18 18   AST 7 10 53* 11 13 14 12 11 13 14        Imaging:   No images are attached to the encounter.     Assessment/Plan:    IMPRESSION:   Ms. Leigh is doing very well now 8 years status post liver transplantation.  She is cured of her HCC.  She will continue to get blood tests every 3 months.  I have gone over vaccinations.  She is up to date with regard to colorectal cancer screening.  She does need to see a dermatologist once a year for skin cancer screening and my plan will be to see her back in the clinic again in 1 year.     I did spend a total of 30 minutes (on the date of the encounter), including 20 minutes of face-to-face clinic time including counseling. The rest of the time was spent in documentation and review of records.    Thank you very much for allowing me to participate in the care of this patient.  If you have any questions regarding my recommendations, please do not hesitate to contact me.         Chuck Mata MD      Professor of Medicine  [employer]      Executive Medical Director, Solid Organ Transplant Program  [facility]

## 2024-06-06 ENCOUNTER — TELEPHONE (OUTPATIENT)
Dept: TRANSPLANT | Facility: CLINIC | Age: 74
End: 2024-06-06
Payer: COMMERCIAL

## 2024-06-06 RX ORDER — GLIPIZIDE 5 MG/1
5 TABLET, FILM COATED, EXTENDED RELEASE ORAL DAILY
COMMUNITY

## 2024-06-06 NOTE — TELEPHONE ENCOUNTER
Patient stated her PCP has started her on a new medication for her type 2 diabetes and needing to talk with Dr. Mata I told her I can reach out to her Coordinator, to return her call.    Returned Joselyn's call:    States that she has type 2 diabetes, PCP had previously put her on metformin. Her numbers are not improving on this alone. PCP started her on 5 mg glipizide and having her test her blood sugars 3x/ day.    Discussed with transplant pharmacist to confirm, he states there should be no issue with the combination as long as her kidneys can support the metformin. Kidney function is WNL. Relayed this information to patient, advised that she continue lab testing every 2-3 months as ordered to follow up on kidney function, patient acknowledged.     Patient is concerned that the medication isn't working as it shoul. She started yesterday and isn't seeing much for results.   Advised patient to continue taking medication as prescribed and to keep track of her blood sugars as directed by her PCP. SHe states she has follow up with him in 1 month- advised her to bring all of her blood sugar data to that appointment for her PCP to review.     Patient verbalized understanding, no further questions or concerns at this time.

## 2024-06-17 ENCOUNTER — TELEPHONE (OUTPATIENT)
Dept: TRANSPLANT | Facility: CLINIC | Age: 74
End: 2024-06-17
Payer: COMMERCIAL

## 2024-06-17 DIAGNOSIS — Z13.220 LIPID SCREENING: ICD-10-CM

## 2024-06-17 DIAGNOSIS — Z94.4 LIVER REPLACED BY TRANSPLANT (H): Primary | ICD-10-CM

## 2024-06-17 NOTE — TELEPHONE ENCOUNTER
Please call Joselyn;  She stated, `she was retuning call from care team.'    Returned Joselyn's call:    She has concerns about her metformin. She states that she has been having more diarrhea now that she is taking metformin twice a day. She aso states that her blood sugars have not improved since starting the glipizide, and that she found on the internet that patients on Tacrolimus shouldn't be taking metformin. States that Dr. Mata also told her that her blood sugars were cause by her Tacrolimus.   Reviewed these medications with transplant pharmacist Luis Matt on a previous call with this patient, he states that this regimen is acceptable as long as kidneys are being closely monitored. Reiterated this to patient, reminded her to have her labs done every 2-3 months to monitor kidney functions, etc. Reviewed with her that her glucose and any prescriptions related to glucose management are managed by her primary care provider and encouraged her to bring any concerns and all blood sugar measurements to her PCP for review. Patient acknowledges, says she has an appointment with him in 2 weeks. Encouraged her to follow up with us if she has other questions or if any medication changes are made with he PCP.

## 2024-08-12 ENCOUNTER — TELEPHONE (OUTPATIENT)
Dept: TRANSPLANT | Facility: CLINIC | Age: 74
End: 2024-08-12
Payer: COMMERCIAL

## 2024-08-12 DIAGNOSIS — Z94.4 LIVER REPLACED BY TRANSPLANT (H): ICD-10-CM

## 2024-08-12 RX ORDER — TACROLIMUS 1 MG/1
1 CAPSULE ORAL EVERY 12 HOURS
Qty: 180 CAPSULE | Refills: 3 | Status: SHIPPED | OUTPATIENT
Start: 2024-08-12

## 2024-08-12 NOTE — TELEPHONE ENCOUNTER
ISSUE:   Tacrolimus IR level 6.1 on 8/9, goal 3-5, dose 2 mg in the am and 1 mg in the pm.    PLAN:   Call Patient and confirm this was an accurate 12-hour trough.   Verify Tacrolimus IR dose 2 mg in the am and 1 mg in the pm.   Confirm no new medications or or missed doses.   Confirm no new illness / infection / diarrhea.   If accurate trough and accurate dose, decrease Tacrolimus IR dose to 1 mg BID     Is this more than a 50% increase or decrease in current IS dose: No  If YES, justification: NA    Repeat labs in 1 months.  *If > 50% change in immunosuppression dose, repeat labs in 1 week.     Maitlde Roman RN on 8/12/2024 at 1:42 PM      OUTCOME:   Spoke with Patient, they confirm accurate trough level and current dose 2 mg am, 1 mg pm BID.   Patient confirmed dose change to 1 mg BID.  Patient agreed to repeat labs in 1 months.   Orders sent to preferred pharmacy for dose change and lab for repeat labs.   Patient voiced understanding of plan.     Lashawn Merchant LPN

## 2024-11-07 ENCOUNTER — TELEPHONE (OUTPATIENT)
Dept: TRANSPLANT | Facility: CLINIC | Age: 74
End: 2024-11-07
Payer: COMMERCIAL

## 2024-11-07 NOTE — TELEPHONE ENCOUNTER
General  Route to LPN    Reason for call: Needs updated lab orders fax [phone]    Call back needed? Yes    Return Call Needed  Same as documented in contacts section  When to return call?: Greater than one day: Route standard priority

## 2024-11-07 NOTE — LETTER
OUTPATIENT LABORATORY TEST ORDER  Laboratory  Fax#[phone]     Patient Name: Joselyn Leigh   YOB: 1950     [facility] MR# [if applicable]: [id number]   Date & Time: November 7, 2024  11:47 AM  Expiration Date: 1 year after date issued       Diagnosis: Liver Transplant (ICD-10 Z94.4)   Aftercare following organ transplant (ICD-10 Z48.288)   Long term use of medications (ICD-10 Z79.899)      We ask your assistance in obtaining the following laboratory tests, which are part of our routine surveillance program for Solid Organ Transplant patients.     Please fax each result to [phone], same day as resulted/available    Critical lab results page [phone]    >1-year post-transplant  Every 2-3 months and as needed  CBC with Platelets   Basic Metabolic Panel   Hepatic panel   Tacrolimus drug level - 12 hour trough, please document time of last dose       Labs annually due November 2024  Phosphorus  Magnesium  Fasting Lipid Panel  Urine protein/creatinine ratio  UA with reflex to micro      If you have any questions, please call [facility]- [phone] or [phone], Fax- [phone].    .

## 2024-11-12 ENCOUNTER — TELEPHONE (OUTPATIENT)
Dept: TRANSPLANT | Facility: CLINIC | Age: 74
End: 2024-11-12
Payer: COMMERCIAL

## 2024-11-12 NOTE — TELEPHONE ENCOUNTER
General  Route to LPN    Reason for call: Pt is calling about her tacro she just checked irma results she said     Call back needed? Yes    Return Call Needed  Same as documented in contacts section  When to return call?: Greater than one day: Route standard priority  Returned patient's call:  Patient did not answer, left a voicemail with a call back number.     EDIT:  Joselyn returned my call:  Wanted to double check on her Tac level. Let her know she was within range.   Discussed her lipid panel and glucose levels- encouraged her to see an endocrinologist- patient acknowledged

## 2024-12-29 ENCOUNTER — HEALTH MAINTENANCE LETTER (OUTPATIENT)
Age: 74
End: 2024-12-29

## 2025-02-11 ENCOUNTER — TELEPHONE (OUTPATIENT)
Dept: TRANSPLANT | Facility: CLINIC | Age: 75
End: 2025-02-11
Payer: COMMERCIAL

## 2025-02-11 NOTE — TELEPHONE ENCOUNTER
"Returned Joselyn's call:    Had concerns about her labs and is wondering what to do next.   Reassured patient that the Alk Phos was only mildly elevated, patient denies any recent illness or other symptoms. Will have her labs done in 2 weeks at [facility].     Patient states that she is taking Metformin 500mg daily, discontinued her glipizide due to side effects (she states it caused her to have high blood sugars). Patient states that she called an endocrinologist as she had been referred by her PCP and they told her \"high blood sugars are not our department, it is your liver doctor's responsibility because you are on Tac and that's causing your high sugars\".   Encouraged Joselyn to get back in touch with her PCP to discuss blood sugar management and possible repeat referral to endocrinology- patient states she will call tomorrow.    Will follow up with lab results in 2 weeks.   "

## 2025-02-11 NOTE — TELEPHONE ENCOUNTER
Pt calling concerned re her labs from 2/10/25  Wants to review before she makes an appt. Would like a call back today if possible

## 2025-02-11 NOTE — LETTER
PHYSICIAN ORDERS      DATE & TIME ISSUED: 2025 3:59 PM  PATIENT NAME: Joselyn Leigh   : 1950     [facility] MR# [if applicable]: [id number]     DIAGNOSIS:  Liver Transplant  ICD-10 CODE: Z94.4     Please order the following lab work to be drawn ONCE on or around 25:  *BMP  *Hepatic panel     Any questions please call: [phone] option 5    Please fax each result same day as resulted/available to  [phone].  Critical lab results page [phone] option 5    Thank you,    .

## 2025-02-17 ENCOUNTER — TELEPHONE (OUTPATIENT)
Dept: TRANSPLANT | Facility: CLINIC | Age: 75
End: 2025-02-17
Payer: COMMERCIAL

## 2025-02-17 DIAGNOSIS — Z94.4 LIVER REPLACED BY TRANSPLANT (H): Primary | ICD-10-CM

## 2025-02-17 NOTE — TELEPHONE ENCOUNTER
[facility] Call [facility]    Phone Message    May a detailed message be left on voicemail: yes     Reason for Call: Other: Patient called back to schedule. She was not happy and scheduled next available in July. If something comes up sooner in May in the afternoon, please contact her. Does not want early morning appts as they drive 2 hours and want afternoons.      Action Taken: Message routed to:  [facility] LAT[facility]ICE SOT    Travel Screening: Not Applicable     Date of Service:

## 2025-02-17 NOTE — TELEPHONE ENCOUNTER
Patient Call: General  Route to LPN    Reason for call: Pt called to ask when she is next scheduled with Dr. Mata. No appointments currently scheduled. Pt was unaware of this, as she states she is supposed to have an appointment in May 2025. Please call back to discuss.    Call back needed? Yes    Return Call Needed  Same as documented in contacts section  When to return call?: Same day: Route High Priority

## 2025-02-17 NOTE — TELEPHONE ENCOUNTER
Returned Joselyn's call:    Joselyn called to ask when her appointment with Dr. Mata is, but it appears as though a May 2025 appointment has not been made for her. Placed a new SOT order for an appointment with Dr. Mata for May. Will see a new endocrinologist soon.   She really wants to see Dr. Mata in May if possible as that is the anniversary of her transplant. Will have schedulers reach out but did express to patient that it may be later in the summer before she can get in to see Dr. Mata.   No further questions or concerns at this time.

## 2025-02-26 ENCOUNTER — TELEPHONE (OUTPATIENT)
Dept: TRANSPLANT | Facility: CLINIC | Age: 75
End: 2025-02-26
Payer: COMMERCIAL

## 2025-02-26 NOTE — TELEPHONE ENCOUNTER
Petrona from the lab calls asking for the order to check labs. Informed Petrona that orders were faxed. Petrona gave writer another fax number.

## 2025-02-26 NOTE — LETTER
PHYSICIAN ORDERS  [facility]  Fax#[phone]            DATE & TIME ISSUED: 2025     PATIENT NAME: Joselyn Leigh     : 1950       [facility] MR# [if applicable]: [id number]       DIAGNOSIS:  Liver Transplant    ICD-10 CODE: Z94.4        Please order the following lab work to be drawn ONCE on or around 25:  *BMP  *Hepatic panel      Any questions please call: [phone] option 5     Please fax each result same day as resulted/available to  [phone].  Critical lab results page [phone] option 5    .

## 2025-05-28 ENCOUNTER — RESULTS FOLLOW-UP (OUTPATIENT)
Dept: TRANSPLANT | Facility: CLINIC | Age: 75
End: 2025-05-28

## 2025-07-02 DIAGNOSIS — Z94.4 LIVER REPLACED BY TRANSPLANT (H): Primary | ICD-10-CM

## 2025-07-21 ENCOUNTER — OFFICE VISIT (OUTPATIENT)
Dept: GASTROENTEROLOGY | Facility: CLINIC | Age: 75
End: 2025-07-21
Attending: INTERNAL MEDICINE
Payer: COMMERCIAL

## 2025-07-21 VITALS
HEART RATE: 89 BPM | BODY MASS INDEX: 25.99 KG/M2 | SYSTOLIC BLOOD PRESSURE: 167 MMHG | OXYGEN SATURATION: 97 % | DIASTOLIC BLOOD PRESSURE: 95 MMHG | WEIGHT: 155 LBS

## 2025-07-21 DIAGNOSIS — Z94.4 LIVER REPLACED BY TRANSPLANT (H): Primary | ICD-10-CM

## 2025-07-21 PROCEDURE — 99215 OFFICE O/P EST HI 40 MIN: CPT | Performed by: INTERNAL MEDICINE

## 2025-07-21 PROCEDURE — G0463 HOSPITAL OUTPT CLINIC VISIT: HCPCS | Performed by: INTERNAL MEDICINE

## 2025-07-21 PROCEDURE — 3080F DIAST BP >= 90 MM HG: CPT | Performed by: INTERNAL MEDICINE

## 2025-07-21 PROCEDURE — 3077F SYST BP >= 140 MM HG: CPT | Performed by: INTERNAL MEDICINE

## 2025-07-21 NOTE — NURSING NOTE
"Chief Complaint   Patient presents with    RECHECK       Vital signs:      BP: (!) 167/95 Pulse: 89     SpO2: 97 %       Weight: 70.3 kg (155 lb)  Estimated body mass index is 25.99 kg/m  as calculated from the following:    Height as of 6/9/17: 1.645 m (5' 4.75\").    Weight as of this encounter: 70.3 kg (155 lb).      Gunjan Machado CMA   7/21/2025 2:26 PM    "

## 2025-07-21 NOTE — LETTER
7/21/2025      Joselyn Leigh  [address] [address]      Dear Colleague,    Thank you for referring your patient, Joselyn Leigh, to the [facility] [place]. Please see a copy of my visit note below.    Solid Organ Transplant Hepatology Follow-Up Visit:     HISTORY OF PRESENT ILLNESS:   I had the pleasure of seeing Joselyn Leigh for followup in the Liver Clinic at the [facility] on July 21, 2025. Ms. Leigh returns for followup now more than 9 years status post liver transplantation for cirrhosis caused by nonalcoholic fatty liver disease and complicated by hepatocellular carcinoma.     She is doing fairly well.  She is most concerned about her diabetes.  In spite of the weight loss, her hemoglobin A1c is now above 10.  She is seeing an endocrinologist at [facility] this week.  She is only on metformin and does have diarrhea with that.  She is not on insulin nor she been on any other agent.    Otherwise, she denies any abdominal pain, itching or skin rash or fatigue.  She does complain of increased abdominal girth.  It seems to reflect the fact that she has very poor abdominal tone.  She denies any gastrointestinal bleeding.     She denies any fevers or chills, cough or shortness of breath.  She denies any nausea or vomiting.  Her appetite has been good and her weight has come down ~ 10#.     Medications:   Current Outpatient Medications   Medication Sig Dispense Refill     calcium-vitamin D (CALTRATE) 600-400 MG-UNIT per tablet Take 1 tablet by mouth 2 times daily       losartan (COZAAR) 100 MG tablet TAKE ONE TABLET BY MOUTH EVERY DAY 30 tablet 0     metFORMIN (GLUCOPHAGE) 500 MG tablet Take 500 mg by mouth daily (with breakfast)       Misc Natural Products (OSTEO BI-FLEX ADV DOUBLE ST PO) Take by mouth daily (Patient not taking: Reported on 5/13/2024)       multivitamin, therapeutic with minerals (MULTI-VITAMIN) TABS Take 1 tablet by  mouth daily prenatal       omeprazole (PRILOSEC) 20 MG capsule Take 20 mg by mouth daily       tacrolimus (GENERIC EQUIVALENT) 1 MG capsule Take 1 capsule (1 mg) by mouth every 12 hours 180 capsule 3     tacrolimus (GENERIC) 1 MG capsule TAKE 2 CAPSULES BY MOUTH EVERY MORNING AND 1 CAPSULE BY MOUTH EVERY EVENING (Patient not taking: Reported on 5/13/2024) 270 capsule 3     No current facility-administered medications for this visit.        Vitals:   There were no vitals taken for this visit.    Physical Exam:   In general she looks well. HEENT exam shows no scleral icterus or temporal muscle wasting. Chest is clear. Abdominal exam shows no increase in girth. No masses or tenderness to palpation are present. Liver is 10 cm in span without left lobe enlargement. No spleen tip is palpable.  Her incision is intact.  Extremity exam shows no edema. Skin exam shows no suspicious lesions and neurologic exam is nonfocal.     Labs:   Her most recent laboratory tests are from May 27 and showed her white count was 4.5, hemoglobin 14.2 and platelets were 195,000.  Her sodium was 138, potassium 5.5, BUN is 24 and creatinine is 0.74.  Her AST is 20, ALT is 21 and alkaline phosphatase 98.  Albumin is 4.1 with a total protein of 6.6 and total bilirubin is 0.6.  Her tacrolimus level was 3.8.    Imaging:   No images are attached to the encounter.     Assessment/Plan:   IMPRESSION:   Ms. Leigh is doing very well now more than 9 years status post liver transplantation.  She is cured of her HCC.  She will continue to get blood tests every 3 months.  I have gone over vaccinations.  She is up to date with regard to colorectal cancer screening.  She does need to see a dermatologist once a year for skin cancer screening and my plan will be to see her back in the clinic again in 1 year.    In terms of her diabetic care, I do think she would be a candidate for either the GLP-1 receptor agonist or the SGLT2 medications.  She is also interested  in discontinuing the metformin.  I did point out that patient is on tacrolimus have impaired insulin release and most patients with posttransplant diabetes are on insulin.     I did spend a total of 40 minutes (on the date of the encounter), including 30 minutes of face-to-face clinic time including counseling. The rest of the time was spent in documentation and review of records.    Thank you very much for allowing me to participate in the care of this patient.  If you have any questions regarding my recommendations, please do not hesitate to contact me.         Chuck Mata MD      Professor of Medicine  [employer]      Executive Medical Director, Solid Organ Transplant Program  [facility]    Again, thank you for allowing me to participate in the care of your patient.        Sincerely,        Chuck Mata MD    Electronically signed

## 2025-07-21 NOTE — PROGRESS NOTES
Solid Organ Transplant Hepatology Follow-Up Visit:     HISTORY OF PRESENT ILLNESS:   I had the pleasure of seeing Joselyn Leigh for followup in the Liver Clinic at the [facility] on July 21, 2025. Ms. Leigh returns for followup now more than 9 years status post liver transplantation for cirrhosis caused by nonalcoholic fatty liver disease and complicated by hepatocellular carcinoma.     She is doing fairly well.  She is most concerned about her diabetes.  In spite of the weight loss, her hemoglobin A1c is now above 10.  She is seeing an endocrinologist at [facility] this week.  She is only on metformin and does have diarrhea with that.  She is not on insulin nor she been on any other agent.    Otherwise, she denies any abdominal pain, itching or skin rash or fatigue.  She does complain of increased abdominal girth.  It seems to reflect the fact that she has very poor abdominal tone.  She denies any gastrointestinal bleeding.     She denies any fevers or chills, cough or shortness of breath.  She denies any nausea or vomiting.  Her appetite has been good and her weight has come down ~ 10#.     Medications:   Current Outpatient Medications   Medication Sig Dispense Refill    calcium-vitamin D (CALTRATE) 600-400 MG-UNIT per tablet Take 1 tablet by mouth 2 times daily      losartan (COZAAR) 100 MG tablet TAKE ONE TABLET BY MOUTH EVERY DAY 30 tablet 0    metFORMIN (GLUCOPHAGE) 500 MG tablet Take 500 mg by mouth daily (with breakfast)      Misc Natural Products (OSTEO BI-FLEX ADV DOUBLE ST PO) Take by mouth daily (Patient not taking: Reported on 5/13/2024)      multivitamin, therapeutic with minerals (MULTI-VITAMIN) TABS Take 1 tablet by mouth daily prenatal      omeprazole (PRILOSEC) 20 MG capsule Take 20 mg by mouth daily      tacrolimus (GENERIC EQUIVALENT) 1 MG capsule Take 1 capsule (1 mg) by mouth every 12 hours 180 capsule 3    tacrolimus (GENERIC) 1 MG capsule TAKE 2 CAPSULES BY  MOUTH EVERY MORNING AND 1 CAPSULE BY MOUTH EVERY EVENING (Patient not taking: Reported on 5/13/2024) 270 capsule 3     No current facility-administered medications for this visit.        Vitals:   There were no vitals taken for this visit.    Physical Exam:   In general she looks well. HEENT exam shows no scleral icterus or temporal muscle wasting. Chest is clear. Abdominal exam shows no increase in girth. No masses or tenderness to palpation are present. Liver is 10 cm in span without left lobe enlargement. No spleen tip is palpable.  Her incision is intact.  Extremity exam shows no edema. Skin exam shows no suspicious lesions and neurologic exam is nonfocal.     Labs:   Her most recent laboratory tests are from May 27 and showed her white count was 4.5, hemoglobin 14.2 and platelets were 195,000.  Her sodium was 138, potassium 5.5, BUN is 24 and creatinine is 0.74.  Her AST is 20, ALT is 21 and alkaline phosphatase 98.  Albumin is 4.1 with a total protein of 6.6 and total bilirubin is 0.6.  Her tacrolimus level was 3.8.    Imaging:   No images are attached to the encounter.     Assessment/Plan:   IMPRESSION:   Ms. Leigh is doing very well now more than 9 years status post liver transplantation.  She is cured of her HCC.  She will continue to get blood tests every 3 months.  I have gone over vaccinations.  She is up to date with regard to colorectal cancer screening.  She does need to see a dermatologist once a year for skin cancer screening and my plan will be to see her back in the clinic again in 1 year.    In terms of her diabetic care, I do think she would be a candidate for either the GLP-1 receptor agonist or the SGLT2 medications.  She is also interested in discontinuing the metformin.  I did point out that patient is on tacrolimus have impaired insulin release and most patients with posttransplant diabetes are on insulin.     I did spend a total of 40 minutes (on the date of the encounter), including 30  minutes of face-to-face clinic time including counseling. The rest of the time was spent in documentation and review of records.    Thank you very much for allowing me to participate in the care of this patient.  If you have any questions regarding my recommendations, please do not hesitate to contact me.         Chuck Mata MD      Professor of Medicine  [employer]      Executive Medical Director, Solid Organ Transplant Program  [facility]

## (undated) RX ORDER — FENTANYL CITRATE 50 UG/ML
INJECTION, SOLUTION INTRAMUSCULAR; INTRAVENOUS
Status: DISPENSED
Start: 2017-06-21